# Patient Record
Sex: MALE | Race: WHITE | NOT HISPANIC OR LATINO | Employment: OTHER | URBAN - METROPOLITAN AREA
[De-identification: names, ages, dates, MRNs, and addresses within clinical notes are randomized per-mention and may not be internally consistent; named-entity substitution may affect disease eponyms.]

---

## 2021-08-17 ENCOUNTER — OFFICE VISIT (OUTPATIENT)
Dept: GASTROENTEROLOGY | Facility: CLINIC | Age: 66
End: 2021-08-17

## 2021-08-17 VITALS
BODY MASS INDEX: 22.96 KG/M2 | TEMPERATURE: 97.8 F | SYSTOLIC BLOOD PRESSURE: 112 MMHG | HEIGHT: 72 IN | DIASTOLIC BLOOD PRESSURE: 82 MMHG | WEIGHT: 169.5 LBS | OXYGEN SATURATION: 96 % | HEART RATE: 79 BPM

## 2021-08-17 DIAGNOSIS — K59.04 CHRONIC IDIOPATHIC CONSTIPATION: Primary | ICD-10-CM

## 2021-08-17 DIAGNOSIS — K63.5 POLYP OF COLON, UNSPECIFIED PART OF COLON, UNSPECIFIED TYPE: ICD-10-CM

## 2021-08-17 DIAGNOSIS — Z80.0 FAMILY HISTORY OF COLON CANCER: ICD-10-CM

## 2021-08-17 DIAGNOSIS — Z12.11 ENCOUNTER FOR SCREENING FOR MALIGNANT NEOPLASM OF COLON: ICD-10-CM

## 2021-08-17 PROCEDURE — 99203 OFFICE O/P NEW LOW 30 MIN: CPT | Performed by: NURSE PRACTITIONER

## 2021-08-17 RX ORDER — PSEUDOEPHED/ACETAMINOPH/DIPHEN 30MG-500MG
TABLET ORAL
COMMUNITY
Start: 2021-08-11 | End: 2021-08-17 | Stop reason: ALTCHOICE

## 2021-08-17 RX ORDER — DOXAZOSIN MESYLATE 4 MG/1
4 TABLET ORAL
COMMUNITY
Start: 2021-07-19

## 2021-08-17 RX ORDER — KETOCONAZOLE 20 MG/ML
SHAMPOO TOPICAL
COMMUNITY
Start: 2021-06-29

## 2021-08-17 RX ORDER — SUCRALFATE 1 G/1
1 TABLET ORAL 3 TIMES DAILY
COMMUNITY
Start: 2021-07-19

## 2021-08-17 RX ORDER — BUSPIRONE HYDROCHLORIDE 10 MG/1
10 TABLET ORAL 2 TIMES DAILY
COMMUNITY
Start: 2021-07-19

## 2021-08-17 RX ORDER — CHLORHEXIDINE GLUCONATE 0.12 MG/ML
RINSE ORAL
COMMUNITY
Start: 2021-05-10

## 2021-08-17 RX ORDER — MECLIZINE HYDROCHLORIDE 25 MG/1
25 TABLET ORAL DAILY PRN
COMMUNITY
Start: 2021-07-26 | End: 2021-08-17 | Stop reason: ALTCHOICE

## 2021-08-17 RX ORDER — DOCUSATE SODIUM -SENNOSIDES 50; 8.6 MG/1; MG/1
1 TABLET, COATED ORAL 2 TIMES DAILY
COMMUNITY
Start: 2021-07-19

## 2021-08-17 RX ORDER — CALCIUM CARBONATE 500 MG/1
TABLET, CHEWABLE ORAL
COMMUNITY
Start: 2021-07-30

## 2021-08-17 RX ORDER — CLOZAPINE 100 MG/1
TABLET ORAL
COMMUNITY
Start: 2021-07-28 | End: 2021-08-17 | Stop reason: ALTCHOICE

## 2021-08-17 RX ORDER — ATORVASTATIN CALCIUM 40 MG/1
40 TABLET, FILM COATED ORAL
COMMUNITY
Start: 2021-07-19

## 2021-08-17 RX ORDER — GABAPENTIN 300 MG/1
300 CAPSULE ORAL 3 TIMES DAILY
Status: ON HOLD | COMMUNITY
Start: 2021-08-14 | End: 2022-01-06

## 2021-08-17 RX ORDER — DILTIAZEM HYDROCHLORIDE 60 MG/1
2 TABLET, FILM COATED ORAL 2 TIMES DAILY
COMMUNITY
Start: 2021-07-19

## 2021-08-17 RX ORDER — MULTIVITAMIN
1 TABLET ORAL DAILY
COMMUNITY
Start: 2021-07-19

## 2021-08-17 RX ORDER — BENZTROPINE MESYLATE 1 MG/1
2 TABLET ORAL
COMMUNITY
Start: 2021-07-19

## 2021-08-17 RX ORDER — MULTIVIT-MIN/IRON/FOLIC ACID/K 18-600-40
1000 CAPSULE ORAL DAILY
COMMUNITY
Start: 2021-07-19 | End: 2021-08-17 | Stop reason: ALTCHOICE

## 2021-08-17 RX ORDER — OMEPRAZOLE 40 MG/1
40 CAPSULE, DELAYED RELEASE ORAL DAILY
COMMUNITY
Start: 2021-07-19

## 2021-08-17 RX ORDER — PNV NO.95/FERROUS FUM/FOLIC AC 28MG-0.8MG
1 TABLET ORAL 2 TIMES DAILY
COMMUNITY
Start: 2021-07-19

## 2021-08-17 RX ORDER — HYDRALAZINE HYDROCHLORIDE 50 MG/1
50 TABLET, FILM COATED ORAL 2 TIMES DAILY
COMMUNITY
Start: 2021-07-19

## 2021-08-17 RX ORDER — FLUTICASONE PROPIONATE 50 MCG
SPRAY, SUSPENSION (ML) NASAL
COMMUNITY
Start: 2021-07-26

## 2021-08-17 RX ORDER — POLYETHYLENE GLYCOL 3350 17 G/17G
17 POWDER, FOR SOLUTION ORAL 2 TIMES DAILY
Qty: 60 PACKET | Refills: 5 | Status: SHIPPED | OUTPATIENT
Start: 2021-08-17 | End: 2022-01-19

## 2021-08-17 RX ORDER — CETIRIZINE HYDROCHLORIDE 10 MG/1
10 TABLET ORAL DAILY
COMMUNITY
Start: 2021-07-19

## 2021-08-17 RX ORDER — MONTELUKAST SODIUM 10 MG/1
10 TABLET ORAL DAILY
COMMUNITY
Start: 2021-07-19

## 2021-08-17 RX ORDER — FINASTERIDE 5 MG/1
5 TABLET, FILM COATED ORAL DAILY
COMMUNITY
Start: 2021-07-19

## 2021-08-17 RX ORDER — ACETAMINOPHEN AND CODEINE PHOSPHATE 300; 30 MG/1; MG/1
0.5 TABLET ORAL 2 TIMES DAILY
COMMUNITY
Start: 2021-07-15

## 2021-08-17 NOTE — PATIENT INSTRUCTIONS
Start Miralax 17 g twice daily for constipation.   Prescription sent electronically to [facility].    Stop miralax as needed dose    Continue Senexon - 50-8.6 mg one pill twice daily.     Will request previous colonoscopy records and our office will contact you regarding timing of next colonoscopy.   Will also need additional bowel prep for long standing constipation with next colonoscopy as discussed

## 2021-08-17 NOTE — PROGRESS NOTES
"Chief Complaint   Patient presents with   • Constipation         History of Present Illness  66-year-old male presents today for constipation.  He was a patient of our previous office with several colonoscopies.  He has a history of colon polyps and and family history of colon cancer in his maternal grandmother.     He has chronic constipation.  He is currently taking Senokot twice daily.  He has an order for MiraLAX but does not take this regularly.  He can go up to 2 weeks without a spontaneous bowel movement.  He will experience darker stools but no bright red blood per rectum.  This has been a longstanding pattern.      We do not have his previous endoscopic evaluation for review at today's visit.  He states he has been having colonoscopies every 3 years given family history and personal history of colon polyps.    He has had previous colonoscopies with poor prep despite following the prep instructions.  He is wondering if he will need a 2-day prep or a stronger prep for his next colonoscopy.    Vital Signs:   /82   Pulse 79   Temp 97.8 °F (36.6 °C)   Ht 182.9 cm (72\")   Wt 76.9 kg (169 lb 8 oz)   SpO2 96%   BMI 22.99 kg/m²     Body mass index is 22.99 kg/m².     Physical Exam  Vitals reviewed.   Constitutional:       General: He is not in acute distress.     Appearance: Normal appearance. He is not ill-appearing.   Abdominal:      General: Bowel sounds are normal. There is no distension.      Palpations: Abdomen is soft. Abdomen is not rigid. There is no mass or pulsatile mass.      Tenderness: There is no abdominal tenderness. There is no guarding or rebound.   Neurological:      Mental Status: He is alert.       Assessment and Plan    Diagnoses and all orders for this visit:    1. Chronic idiopathic constipation (Primary)  -     polyethylene glycol (MIRALAX) 17 g packet; Take 17 g by mouth 2 (Two) Times a Day.  Dispense: 60 packet; Refill: 5    2. Polyp of colon, unspecified part of colon, " unspecified type    3. Encounter for screening for malignant neoplasm of colon    4. Family history of colon cancer       Chronic idiopathic constipation, longstanding.  Will start MiraLAX 17 g twice daily, prescription provided.  Also written copy of today's instructions provided for his residence.  Will discontinue as needed MiraLAX order as he does not take this regularly and does not remember to ask for it regularly.    We will request previous records, he thinks his last colonoscopy was approximately 2 years ago.  We will make additional recommendations regarding timing of endoscopic evaluation based on review of previous colonoscopy and pathology results.  Will also need to make recommendations regarding bowel prep, will most likely need prescription strength bowel prep or magnesium citrate with polyethylene glycol.    Patient verbalized agreement and understanding with the above plan, all questions answered and support provided.      Patient Instructions   Start Miralax 17 g twice daily for constipation.   Prescription sent electronically to [facility].    Stop miralax as needed dose    Continue Senexon - 50-8.6 mg one pill twice daily.     Will request previous colonoscopy records and our office will contact you regarding timing of next colonoscopy.   Will also need additional bowel prep for long standing constipation with next colonoscopy as discussed          EMR Dragon/Transcription Disclaimer:  This document has been Dictated utilizing Dragon dictation.

## 2021-09-22 ENCOUNTER — OFFICE VISIT (OUTPATIENT)
Dept: GASTROENTEROLOGY | Facility: CLINIC | Age: 66
End: 2021-09-22

## 2021-09-22 VITALS
DIASTOLIC BLOOD PRESSURE: 80 MMHG | BODY MASS INDEX: 22.71 KG/M2 | OXYGEN SATURATION: 98 % | TEMPERATURE: 97.2 F | WEIGHT: 167.7 LBS | HEIGHT: 72 IN | HEART RATE: 81 BPM | SYSTOLIC BLOOD PRESSURE: 120 MMHG

## 2021-09-22 DIAGNOSIS — K63.5 POLYP OF COLON, UNSPECIFIED PART OF COLON, UNSPECIFIED TYPE: ICD-10-CM

## 2021-09-22 DIAGNOSIS — Z12.11 ENCOUNTER FOR SCREENING FOR MALIGNANT NEOPLASM OF COLON: ICD-10-CM

## 2021-09-22 DIAGNOSIS — K59.04 CHRONIC IDIOPATHIC CONSTIPATION: Primary | ICD-10-CM

## 2021-09-22 DIAGNOSIS — Z80.0 FAMILY HISTORY OF COLON CANCER: ICD-10-CM

## 2021-09-22 PROCEDURE — 99213 OFFICE O/P EST LOW 20 MIN: CPT | Performed by: NURSE PRACTITIONER

## 2021-09-22 NOTE — PROGRESS NOTES
"Chief Complaint   Patient presents with   • Follow-up     Obstipation         History of Present Illness  66-year-old male presents today for follow-up.  Last office visit August 17, 2021.  Family history of colon polyps and family history of colon cancer in his maternal grandmother.  He is followed by this office for chronic constipation.    He would like to schedule colonoscopy.    At his last visit MiraLAX was added twice daily in addition to his twice daily Senokot.  He is having more complete bowel movements approximately 3 times per week with this regimen.  He is no longer straining.    Heartburn and reflux is stable.  Will take Tums at times for nausea.  Continues on sucralfate.    Patient has had previous colonoscopies with poor prep despite following prep instructions.  He has been having colonoscopies every 3 years given family history of colon cancer and personal history of colon polyps.  He would like to schedule.    Otherwise patient is doing well from a GI standpoint and is happy with the improvement in constipation.    He is a resident of [facility] in [place].  Phone #[phone].    Vital Signs:   /80   Pulse 81   Temp 97.2 °F (36.2 °C)   Ht 182.9 cm (72\")   Wt 76.1 kg (167 lb 11.2 oz)   SpO2 98%   BMI 22.74 kg/m²     Body mass index is 22.74 kg/m².     Physical Exam  Vitals reviewed.   Constitutional:       General: He is not in acute distress.     Appearance: Normal appearance. He is well-developed and normal weight. He is not ill-appearing.   Pulmonary:      Effort: Pulmonary effort is normal. No respiratory distress.   Skin:     Coloration: Skin is not pale.   Neurological:      Mental Status: He is alert.       Assessment and Plan    Diagnoses and all orders for this visit:    1. Chronic idiopathic constipation (Primary)    2. Polyp of colon, unspecified part of colon, unspecified type    3. Encounter for screening for malignant neoplasm of colon    4. Family history of " colon cancer    Chronic constipation, improved.  Continue MiraLAX 17 g twice daily.  Continue Senokot 1 pill twice daily.  History of colon polyps and significant family history of colon cancer.  Have requested previous colonoscopy records and our office will contact you regarding timing of next colonoscopy.    Patient is a resident of [facility] in [place].    With his previous colonoscopy he was transported via [employer] and once he arrived back to [facility], an staff member was waiting for him, escorting him to his room and monitoring him after conscious sedation.    Patient does not have anyone that can accompany him to his endoscopic procedures and is wondering if we would be able to do the same thing using the medical taxi, telephone follow-up with [facility] and then waiting for his return [facility] after his procedure.    We will discuss with Dr. Valdivia and contact patient with this information.    ADDENDUM: 9/24/2021 1040 am:  Spoke with Juan with [facility] at [phone].    Patient does not have a guardian.    He makes his own medical decisions.    She confirmed that [facility] and be available to discuss results and recommendations after his procedure.    Will await previous colonoscopy operative report and pathology and discuss with Dr. Valdivia and endoscopy department and will contact patient next week with information. Linden    ADDENDUM:   Spoke with [facility] at [phone], they will be available after his endoscopy procedure to meet patient at [facility] when he is delivered back to his residence, [facility] via [employer].  Once endoscopy is completed, they will contact medical taxi service and they will return to pick patient up.    Dr. Valdivia has done this for patient in the past and is comfortable and agrees to this procedure as patient has no other means  of transportation and [facility], his residence has agreed to meet patient at [facility] as they have done in the past with his previous procedures.Linden    EMR Dragon/Transcription Disclaimer:  This document has been Dictated utilizing Dragon dictation.

## 2021-09-27 ENCOUNTER — PREP FOR SURGERY (OUTPATIENT)
Dept: SURGERY | Facility: SURGERY CENTER | Age: 66
End: 2021-09-27

## 2021-09-27 DIAGNOSIS — K63.5 POLYP OF COLON, UNSPECIFIED PART OF COLON, UNSPECIFIED TYPE: ICD-10-CM

## 2021-09-27 DIAGNOSIS — Z80.0 FAMILY HISTORY OF COLON CANCER: Primary | ICD-10-CM

## 2021-09-27 DIAGNOSIS — Z12.11 ENCOUNTER FOR SCREENING FOR MALIGNANT NEOPLASM OF COLON: ICD-10-CM

## 2021-09-27 RX ORDER — SODIUM CHLORIDE, SODIUM LACTATE, POTASSIUM CHLORIDE, CALCIUM CHLORIDE 600; 310; 30; 20 MG/100ML; MG/100ML; MG/100ML; MG/100ML
30 INJECTION, SOLUTION INTRAVENOUS CONTINUOUS PRN
Status: CANCELLED | OUTPATIENT
Start: 2021-09-27

## 2021-09-27 RX ORDER — SODIUM CHLORIDE 0.9 % (FLUSH) 0.9 %
3 SYRINGE (ML) INJECTION EVERY 12 HOURS SCHEDULED
Status: CANCELLED | OUTPATIENT
Start: 2021-09-27

## 2021-09-27 RX ORDER — SODIUM CHLORIDE 0.9 % (FLUSH) 0.9 %
10 SYRINGE (ML) INJECTION AS NEEDED
Status: CANCELLED | OUTPATIENT
Start: 2021-09-27

## 2021-10-12 ENCOUNTER — TELEPHONE (OUTPATIENT)
Dept: GASTROENTEROLOGY | Facility: CLINIC | Age: 66
End: 2021-10-12

## 2021-11-03 ENCOUNTER — TELEPHONE (OUTPATIENT)
Dept: GASTROENTEROLOGY | Facility: CLINIC | Age: 66
End: 2021-11-03

## 2021-11-03 PROBLEM — Z12.11 ENCOUNTER FOR SCREENING FOR MALIGNANT NEOPLASM OF COLON: Status: ACTIVE | Noted: 2021-11-03

## 2021-11-03 PROBLEM — K63.5 POLYP OF COLON: Status: ACTIVE | Noted: 2021-11-03

## 2021-12-15 ENCOUNTER — TELEPHONE (OUTPATIENT)
Dept: GASTROENTEROLOGY | Facility: CLINIC | Age: 66
End: 2021-12-15

## 2021-12-21 ENCOUNTER — TELEPHONE (OUTPATIENT)
Dept: GASTROENTEROLOGY | Facility: CLINIC | Age: 66
End: 2021-12-21

## 2021-12-21 NOTE — TELEPHONE ENCOUNTER
Patient is having a colonoscopy on 1/6 and he is taking tylenol with codeine and he wants to make sure that its ok to take.  [phone]

## 2021-12-22 NOTE — TELEPHONE ENCOUNTER
Returned call to deepika canela and had to leave him a message that it is ok to take the medication up until his procedure but not the morning of the procedure

## 2022-01-06 ENCOUNTER — HOSPITAL ENCOUNTER (OUTPATIENT)
Facility: SURGERY CENTER | Age: 67
Setting detail: HOSPITAL OUTPATIENT SURGERY
Discharge: HOME | End: 2022-01-06
Attending: INTERNAL MEDICINE | Admitting: INTERNAL MEDICINE

## 2022-01-06 ENCOUNTER — ANESTHESIA EVENT (OUTPATIENT)
Dept: SURGERY | Facility: SURGERY CENTER | Age: 67
End: 2022-01-06

## 2022-01-06 ENCOUNTER — ANESTHESIA (OUTPATIENT)
Dept: SURGERY | Facility: SURGERY CENTER | Age: 67
End: 2022-01-06

## 2022-01-06 VITALS
RESPIRATION RATE: 16 BRPM | HEART RATE: 92 BPM | WEIGHT: 165.5 LBS | DIASTOLIC BLOOD PRESSURE: 85 MMHG | OXYGEN SATURATION: 100 % | HEIGHT: 72 IN | TEMPERATURE: 98.1 F | SYSTOLIC BLOOD PRESSURE: 125 MMHG | BODY MASS INDEX: 22.42 KG/M2

## 2022-01-06 DIAGNOSIS — Z80.0 FAMILY HISTORY OF COLON CANCER: ICD-10-CM

## 2022-01-06 DIAGNOSIS — K63.5 POLYP OF COLON, UNSPECIFIED PART OF COLON, UNSPECIFIED TYPE: ICD-10-CM

## 2022-01-06 DIAGNOSIS — Z12.11 ENCOUNTER FOR SCREENING FOR MALIGNANT NEOPLASM OF COLON: ICD-10-CM

## 2022-01-06 PROCEDURE — 45385 COLONOSCOPY W/LESION REMOVAL: CPT | Performed by: INTERNAL MEDICINE

## 2022-01-06 PROCEDURE — 45380 COLONOSCOPY AND BIOPSY: CPT | Performed by: INTERNAL MEDICINE

## 2022-01-06 PROCEDURE — 88305 TISSUE EXAM BY PATHOLOGIST: CPT | Performed by: INTERNAL MEDICINE

## 2022-01-06 PROCEDURE — 0DBK8ZZ EXCISION OF ASCENDING COLON, VIA NATURAL OR ARTIFICIAL OPENING ENDOSCOPIC: ICD-10-PCS | Performed by: NURSE PRACTITIONER

## 2022-01-06 PROCEDURE — 25010000002 PROPOFOL 10 MG/ML EMULSION: Performed by: ANESTHESIOLOGY

## 2022-01-06 PROCEDURE — 45381 COLONOSCOPY SUBMUCOUS NJX: CPT | Performed by: INTERNAL MEDICINE

## 2022-01-06 RX ORDER — LIDOCAINE HYDROCHLORIDE 20 MG/ML
INJECTION, SOLUTION INFILTRATION; PERINEURAL AS NEEDED
Status: DISCONTINUED | OUTPATIENT
Start: 2022-01-06 | End: 2022-01-06 | Stop reason: SURG

## 2022-01-06 RX ORDER — SODIUM CHLORIDE, SODIUM LACTATE, POTASSIUM CHLORIDE, CALCIUM CHLORIDE 600; 310; 30; 20 MG/100ML; MG/100ML; MG/100ML; MG/100ML
30 INJECTION, SOLUTION INTRAVENOUS CONTINUOUS PRN
Status: DISCONTINUED | OUTPATIENT
Start: 2022-01-06 | End: 2022-01-06 | Stop reason: HOSPADM

## 2022-01-06 RX ORDER — SODIUM CHLORIDE 0.9 % (FLUSH) 0.9 %
10 SYRINGE (ML) INJECTION AS NEEDED
Status: DISCONTINUED | OUTPATIENT
Start: 2022-01-06 | End: 2022-01-06 | Stop reason: HOSPADM

## 2022-01-06 RX ORDER — SODIUM CHLORIDE 0.9 % (FLUSH) 0.9 %
3 SYRINGE (ML) INJECTION EVERY 12 HOURS SCHEDULED
Status: DISCONTINUED | OUTPATIENT
Start: 2022-01-06 | End: 2022-01-06 | Stop reason: HOSPADM

## 2022-01-06 RX ORDER — PROPOFOL 10 MG/ML
VIAL (ML) INTRAVENOUS AS NEEDED
Status: DISCONTINUED | OUTPATIENT
Start: 2022-01-06 | End: 2022-01-06 | Stop reason: SURG

## 2022-01-06 RX ADMIN — PROPOFOL 180 MCG/KG/MIN: 10 INJECTION, EMULSION INTRAVENOUS at 09:53

## 2022-01-06 RX ADMIN — LIDOCAINE HYDROCHLORIDE 60 MG: 20 INJECTION, SOLUTION INFILTRATION; PERINEURAL at 09:53

## 2022-01-06 RX ADMIN — PROPOFOL 80 MG: 10 INJECTION, EMULSION INTRAVENOUS at 09:53

## 2022-01-06 RX ADMIN — SODIUM CHLORIDE, POTASSIUM CHLORIDE, SODIUM LACTATE AND CALCIUM CHLORIDE 30 ML/HR: 600; 310; 30; 20 INJECTION, SOLUTION INTRAVENOUS at 09:07

## 2022-01-06 NOTE — ANESTHESIA POSTPROCEDURE EVALUATION
"Patient: Trevor Fox    Procedure Summary     Date: 01/06/22 Room / Location: [facility] / [facility]    Anesthesia Start: 0948 Anesthesia Stop: 1025    Procedure: COLONOSCOPY FOR SCREENING (N/A ) Diagnosis:       Family history of colon cancer      Polyp of colon, unspecified part of colon, unspecified type      Encounter for screening for malignant neoplasm of colon      (Family history of colon cancer [Z80.0])      (Polyp of colon, unspecified part of colon, unspecified type [K63.5])      (Encounter for screening for malignant neoplasm of colon [Z12.11])    Surgeons: Ramon Valdivia MD Provider: Alirio Melissa MD    Anesthesia Type: MAC ASA Status: 3          Anesthesia Type: MAC    Vitals  Vitals Value Taken Time   /85 01/06/22 1040   Temp 36.7 °C (98.1 °F) 01/06/22 1025   Pulse 92 01/06/22 1040   Resp 16 01/06/22 1040   SpO2 100 % 01/06/22 1040           Post Anesthesia Care and Evaluation    Patient location during evaluation: bedside  Patient participation: complete - patient participated  Level of consciousness: awake and alert  Pain management: adequate  Airway patency: patent  Anesthetic complications: No anesthetic complications    Cardiovascular status: acceptable  Respiratory status: acceptable  Hydration status: acceptable    Comments: /85   Pulse 92   Temp 36.7 °C (98.1 °F) (Infrared)   Resp 16   Ht 182.9 cm (72\")   Wt 75.1 kg (165 lb 8 oz)   SpO2 100%   BMI 22.45 kg/m²           "

## 2022-01-06 NOTE — H&P
No chief complaint on file.      HPI  H/o polyps         Problem List:    Patient Active Problem List   Diagnosis   • Family history of colon cancer   • Polyp of colon   • Encounter for screening for malignant neoplasm of colon       Medical History:    Past Medical History:   Diagnosis Date   • Anemia    • Chronic schizophrenia (HCC)    • Enlarged prostate    • GERD (gastroesophageal reflux disease)    • Hyperlipidemia    • Hypertension    • Kidney disease     Stage 2   • Seasonal allergies         Social History:    Social History     Socioeconomic History   • Marital status:    Tobacco Use   • Smoking status: Former Smoker   • Smokeless tobacco: Former User     Types: Chew   • Tobacco comment: 30 years ago   Vaping Use   • Vaping Use: Never used   Substance and Sexual Activity   • Alcohol use: Not Currently   • Drug use: Never   • Sexual activity: Defer       Family History:   Family History   Problem Relation Age of Onset   • Colon cancer Maternal Grandmother    • Colon polyps Neg Hx    • Crohn's disease Neg Hx    • Irritable bowel syndrome Neg Hx    • Ulcerative colitis Neg Hx        Surgical History:   Past Surgical History:   Procedure Laterality Date   • COLONOSCOPY     • CYST REMOVAL     • ENDOSCOPY         No current facility-administered medications for this encounter.    Current Outpatient Medications:   •  acetaminophen-codeine (TYLENOL #3) 300-30 MG per tablet, Take 0.5 tablets by mouth 2 (Two) Times a Day., Disp: , Rfl:   •  Antacid Regular Strength 500 MG chewable tablet, TAKE TWO TABLETS BY MOUTH THREE TIMES DAILY AS NEEDED, Disp: , Rfl:   •  aspirin 325 MG EC tablet, Take 325 mg by mouth Daily., Disp: , Rfl:   •  atorvastatin (LIPITOR) 40 MG tablet, Take 40 mg by mouth every night at bedtime., Disp: , Rfl:   •  benztropine (COGENTIN) 1 MG tablet, Take 2 mg by mouth every night at bedtime., Disp: , Rfl:   •  busPIRone (BUSPAR) 10 MG tablet, Take 10 mg by mouth 2 (Two) Times a Day., Disp: ,  Rfl:   •  cetirizine (zyrTEC) 10 MG tablet, Take 10 mg by mouth Daily., Disp: , Rfl:   •  chlorhexidine (PERIDEX) 0.12 % solution, RINSE TWO TO THREE TIMES PER DAY AS DIRECTED BY YOUR DENTIST, Disp: , Rfl:   •  doxazosin (CARDURA) 4 MG tablet, Take 4 mg by mouth every night at bedtime., Disp: , Rfl:   •  ferrous sulfate 325 (65 Fe) MG tablet, Take 1 tablet by mouth 2 (Two) Times a Day., Disp: , Rfl:   •  finasteride (PROSCAR) 5 MG tablet, Take 5 mg by mouth Daily., Disp: , Rfl:   •  fluticasone (FLONASE) 50 MCG/ACT nasal spray, INSTILL TWO SPRAYS in EACH nostril ONCE daily, Disp: , Rfl:   •  gabapentin (NEURONTIN) 300 MG capsule, Take 300 mg by mouth 3 (Three) Times a Day., Disp: , Rfl:   •  hydrALAZINE (APRESOLINE) 50 MG tablet, Take 50 mg by mouth 2 (Two) Times a Day., Disp: , Rfl:   •  Incruse Ellipta 62.5 MCG/INH aerosol powder , INHALE ONE PUFF BY MOUTH AT BEDTIME, Disp: , Rfl:   •  ketoconazole (NIZORAL) 2 % shampoo, , Disp: , Rfl:   •  metoprolol tartrate (LOPRESSOR) 25 MG tablet, Take 25 mg by mouth Daily., Disp: , Rfl:   •  montelukast (SINGULAIR) 10 MG tablet, Take 10 mg by mouth Daily., Disp: , Rfl:   •  multivitamin tablet, Take 1 tablet by mouth Daily., Disp: , Rfl:   •  omeprazole (priLOSEC) 40 MG capsule, Take 40 mg by mouth Daily., Disp: , Rfl:   •  polyethylene glycol (MIRALAX) 17 g packet, Take 17 g by mouth 2 (Two) Times a Day., Disp: 60 packet, Rfl: 5  •  Senexon-S 8.6-50 MG per tablet, Take 1 tablet by mouth 2 (Two) Times a Day., Disp: , Rfl:   •  sucralfate (CARAFATE) 1 g tablet, Take 1 g by mouth 3 (Three) Times a Day., Disp: , Rfl:   •  Symbicort 80-4.5 MCG/ACT inhaler, Inhale 2 puffs 2 (Two) Times a Day., Disp: , Rfl:     Allergies: No Known Allergies     The following portions of the patient's history were reviewed by me and updated as appropriate: review of systems, allergies, current medications, past family history, past medical history, past social history, past surgical history and  problem list.    There were no vitals filed for this visit.    PHYSICAL EXAM:    CONSTITUTIONAL:  today's vital signs reviewed by me  GASTROINTESTINAL: abdomen is soft nontender nondistended with normal active bowel sounds, no masses are appreciated    Assessment/ Plan  H/o polyps    colonoscopy    Risks and benefits as well as alternatives to endoscopic evaluation were explained to the patient and they voiced understanding and wish to proceed.  These risks include but are not limited to the risk of bleeding, perforation, adverse reaction to sedation, and missed lesions.  The patient was given the opportunity to ask questions prior to the endoscopic procedure.

## 2022-01-06 NOTE — ANESTHESIA PREPROCEDURE EVALUATION
Anesthesia Evaluation     Patient summary reviewed and Nursing notes reviewed   no history of anesthetic complications:  NPO Solid Status: > 6 hours  NPO Liquid Status: > 6 hours           Airway   Mallampati: II  TM distance: >3 FB  Neck ROM: full  no difficulty expected and No difficulty expected  Dental - normal exam     Pulmonary - negative pulmonary ROS and normal exam    breath sounds clear to auscultation  (-) rhonchi, decreased breath sounds, wheezes, rales, stridor  Cardiovascular - normal exam    NYHA Classification: I  Rhythm: regular  Rate: normal    (+) hypertension, hyperlipidemia,   (-) murmur, weak pulses, friction rub, systolic click, carotid bruits, JVD, peripheral edema      Neuro/Psych  (+) psychiatric history Schizophrenia,     GI/Hepatic/Renal/Endo    (+)  GERD,  renal disease CRI,     Musculoskeletal (-) negative ROS    Abdominal  - normal exam    Abdomen: soft.   Substance History - negative use     OB/GYN negative ob/gyn ROS         Other   blood dyscrasia anemia,                     Anesthesia Plan    ASA 3     MAC     intravenous induction     Anesthetic plan, all risks, benefits, and alternatives have been provided, discussed and informed consent has been obtained with: patient.

## 2022-01-06 NOTE — PRE-PROCEDURE NOTE
Pt unable to remember medications and when taken last.  Called Tatianna at [facility] to fax medications.  Will attach to chart for review.

## 2022-01-07 LAB
LAB AP CASE REPORT: NORMAL
LAB AP CLINICAL INFORMATION: NORMAL
PATH REPORT.FINAL DX SPEC: NORMAL
PATH REPORT.GROSS SPEC: NORMAL

## 2022-01-19 DIAGNOSIS — K59.04 CHRONIC IDIOPATHIC CONSTIPATION: ICD-10-CM

## 2022-01-19 RX ORDER — POLYETHYLENE GLYCOL 3350 17 G/17G
POWDER, FOR SOLUTION ORAL
Qty: 510 G | Refills: 5 | Status: SHIPPED | OUTPATIENT
Start: 2022-01-19 | End: 2022-06-27

## 2022-04-12 ENCOUNTER — OFFICE VISIT (OUTPATIENT)
Dept: GASTROENTEROLOGY | Facility: CLINIC | Age: 67
End: 2022-04-12

## 2022-04-12 VITALS
WEIGHT: 169.1 LBS | OXYGEN SATURATION: 98 % | TEMPERATURE: 96.9 F | HEIGHT: 72 IN | HEART RATE: 90 BPM | DIASTOLIC BLOOD PRESSURE: 70 MMHG | BODY MASS INDEX: 22.9 KG/M2 | SYSTOLIC BLOOD PRESSURE: 128 MMHG

## 2022-04-12 DIAGNOSIS — K59.04 CHRONIC IDIOPATHIC CONSTIPATION: Primary | ICD-10-CM

## 2022-04-12 DIAGNOSIS — Z80.0 FAMILY HISTORY OF COLON CANCER: ICD-10-CM

## 2022-04-12 DIAGNOSIS — D12.2 ADENOMATOUS POLYP OF ASCENDING COLON: ICD-10-CM

## 2022-04-12 PROCEDURE — 99213 OFFICE O/P EST LOW 20 MIN: CPT | Performed by: NURSE PRACTITIONER

## 2022-04-12 RX ORDER — ALBUTEROL SULFATE 90 UG/1
AEROSOL, METERED RESPIRATORY (INHALATION)
COMMUNITY
Start: 2022-01-03

## 2022-04-12 RX ORDER — GABAPENTIN 300 MG/1
CAPSULE ORAL
COMMUNITY
Start: 2022-01-17

## 2022-04-12 RX ORDER — MECLIZINE HYDROCHLORIDE 25 MG/1
25 TABLET ORAL DAILY PRN
COMMUNITY
Start: 2022-01-03

## 2022-04-12 RX ORDER — CLOZAPINE 100 MG/1
TABLET ORAL
COMMUNITY
Start: 2022-01-13

## 2022-04-12 RX ORDER — PSEUDOEPHED/ACETAMINOPH/DIPHEN 30MG-500MG
TABLET ORAL
COMMUNITY
Start: 2022-01-17

## 2022-04-12 NOTE — PATIENT INSTRUCTIONS
For history of colon polyps, recommend colonoscopy in 1 year, February 2023.  We will contact you November 2022 to schedule colonoscopy with Dr. Valdivia February 2023.    To help with bowel prep, recommend the addition of magnesium citrate prior to MiraLAX bowel prep.  This can be purchased over-the-counter.  We will give directions regarding bowel prep when we contact you November 2022.    For constipation continue MiraLAX 1 capful twice daily.    For constipation, please check and see if you are taking Senokot 8.6/50, another name is Senexon-S 8.6-50 MG per tablet 1 pill twice daily.  If you are taking this medication 1 pill twice daily, increase to 2 pills twice daily to help with constipation.  This can be taken daily with MiraLAX.    If you are not taking this medication, please restart this medication as it has worked well in the past to help regulate your bowel movements.

## 2022-04-12 NOTE — PROGRESS NOTES
"Chief Complaint   Patient presents with   • Follow-up     Chronic constipation, review recent colonoscopy with large colon polyps           History of Present Illness  66-year-old male presents today for follow-up.  Last visit September 22, 2021.  He underwent colonoscopy anywhere 6/2022.  There is a family history of colon polyps and family history of colon cancer in his maternal grandmother.  He also has a personal history of colon polyps.  He is followed by this office for chronic constipation.    He has noticed constipation has worsened over the past several weeks.  He is currently taking MiraLAX 1 capful twice daily.  He has incomplete evacuation and can go up to 4 5 days without a bowel movement.  Review of his chart shows that he was on Senokot 1 pill twice daily at his last office visit and this was helping bowel movements occur every 2 to 3 days.  He is not sure if he is still taking this medication.    He is a resident of [facility] in [place].  Phone number [phone].    Result Review :       Tissue Pathology Exam (01/06/2022 10:04)   COLONOSCOPY (01/06/2022 09:41)     Vital Signs:   /70   Pulse 90   Temp 96.9 °F (36.1 °C)   Ht 182.9 cm (72\")   Wt 76.7 kg (169 lb 1.6 oz)   SpO2 98%   BMI 22.93 kg/m²     Body mass index is 22.93 kg/m².     Physical Exam  Vitals reviewed.   Constitutional:       General: He is not in acute distress.     Appearance: Normal appearance. He is well-developed and normal weight. He is not ill-appearing, toxic-appearing or diaphoretic.   Pulmonary:      Effort: Pulmonary effort is normal. No respiratory distress.   Abdominal:      General: Abdomen is flat.   Skin:     Coloration: Skin is not pale.   Neurological:      Mental Status: He is alert.           Assessment and Plan    Diagnoses and all orders for this visit:    1. Chronic idiopathic constipation (Primary)    2. Family history of colon cancer    3. Adenomatous polyp of ascending colon     " Colonoscopy January 6, 2022.  Bowel prep was good to poor.  Patient with 3 sessile polyps in the cecum, 2 to 3 mm in size consistent with tubular adenoma.  3 sessile polyps in the hepatic flexure, 4 to 15 mm in size.  Area was tattooed.  Multiple small largemouth diverticula in the sigmoid and descending colon.  Nonbleeding internal hemorrhoids, moderate.  Melanosis coli throughout the entire colon, biopsies negative for microscopic colitis.    Reviewed colonoscopy and pathology results in detail during today's visit.  Patient has difficulty with bowel prep.  He has a history of multiple polyps and regrowing polyp in the ascending colon.  This area was tattooed and Dr. Valdivia recommends survey colonoscopies on a short interval to continue to remove any regrowth of polyp.  This has been scheduled for 1 year.  Patient would like to continue surveillance colonoscopies more frequently and would like to avoid surgical resection of the area if possible.    Given his difficulty with chronic constipation, we will add magnesium citrate to his bowel prep for his colonoscopy in 2023 to help ensure bowel prep is more successful.    We will contact patient November 2022 to schedule colonoscopy for February 2023.    Patient was instructed to continue MiraLAX, he will check at home to see if he is still on Senokot, if he is not, instructed to restart it as this has been helpful to promote bowel movements every 2 to 3 days otherwise if he is on Senokot 1 pill twice daily, patient was instructed to increase Senokot to 2 pills twice daily with MiraLAX to help promote more regular bowel movements.    Patient verbalized agreement and understanding with the above plan, all questions answered and support provided.    Patient is a resident at [facility].  He does not have a guardian, he makes his own medical decisions.  He arrived via [employer] for his colonoscopy January 6, 2022 and was delivered back to his residence at [facility]  house via medical taxi.  We will arrange for the same process at his colonoscopy in 2023 if he continues to reside at [facility].    We will need to let endoscopy know and make sure that we are in touch with [facility] at [phone] to ensure this is still the process that we can expect after his endoscopic procedure.        Patient Instructions   For history of colon polyps, recommend colonoscopy in 1 year, February 2023.  We will contact you November 2022 to schedule colonoscopy with Dr. Valdivia February 2023.    To help with bowel prep, recommend the addition of magnesium citrate prior to MiraLAX bowel prep.  This can be purchased over-the-counter.  We will give directions regarding bowel prep when we contact you November 2022.    For constipation continue MiraLAX 1 capful twice daily.    For constipation, please check and see if you are taking Senokot 8.6/50, another name is Senexon-S 8.6-50 MG per tablet 1 pill twice daily.  If you are taking this medication 1 pill twice daily, increase to 2 pills twice daily to help with constipation.  This can be taken daily with MiraLAX.    If you are not taking this medication, please restart this medication as it has worked well in the past to help regulate your bowel movements.              EMR Dragon/Transcription Disclaimer:  This document has been Dictated utilizing Dragon dictation.

## 2022-06-27 DIAGNOSIS — K59.04 CHRONIC IDIOPATHIC CONSTIPATION: ICD-10-CM

## 2022-06-27 RX ORDER — POLYETHYLENE GLYCOL 3350 17 G/17G
POWDER, FOR SOLUTION ORAL
Qty: 510 G | Refills: 5 | Status: SHIPPED | OUTPATIENT
Start: 2022-06-27

## 2022-09-15 ENCOUNTER — TELEPHONE (OUTPATIENT)
Dept: GASTROENTEROLOGY | Facility: CLINIC | Age: 67
End: 2022-09-15

## 2022-11-10 ENCOUNTER — PREP FOR SURGERY (OUTPATIENT)
Dept: SURGERY | Facility: SURGERY CENTER | Age: 67
End: 2022-11-10

## 2022-11-10 DIAGNOSIS — Z86.010 HISTORY OF COLON POLYPS: ICD-10-CM

## 2022-11-10 DIAGNOSIS — Z12.11 ENCOUNTER FOR SCREENING FOR MALIGNANT NEOPLASM OF COLON: Primary | ICD-10-CM

## 2022-11-11 PROBLEM — Z86.0100 HISTORY OF COLON POLYPS: Status: ACTIVE | Noted: 2022-11-11

## 2022-11-11 PROBLEM — Z86.010 HISTORY OF COLON POLYPS: Status: ACTIVE | Noted: 2022-11-11

## 2022-11-11 RX ORDER — SODIUM CHLORIDE, SODIUM LACTATE, POTASSIUM CHLORIDE, CALCIUM CHLORIDE 600; 310; 30; 20 MG/100ML; MG/100ML; MG/100ML; MG/100ML
30 INJECTION, SOLUTION INTRAVENOUS CONTINUOUS PRN
Status: CANCELLED | OUTPATIENT
Start: 2022-11-11

## 2023-01-26 ENCOUNTER — TELEPHONE (OUTPATIENT)
Dept: GASTROENTEROLOGY | Facility: CLINIC | Age: 68
End: 2023-01-26
Payer: MEDICARE

## 2023-01-26 NOTE — TELEPHONE ENCOUNTER
Patient is requesting Prep instructions and has questions about CLS He is scheduled on .3-. Thank you

## 2023-02-02 ENCOUNTER — TELEPHONE (OUTPATIENT)
Dept: GASTROENTEROLOGY | Facility: CLINIC | Age: 68
End: 2023-02-02
Payer: MEDICARE

## 2023-03-14 ENCOUNTER — TELEPHONE (OUTPATIENT)
Dept: GASTROENTEROLOGY | Facility: CLINIC | Age: 68
End: 2023-03-14
Payer: MEDICARE

## 2023-03-14 NOTE — TELEPHONE ENCOUNTER
Patient called with concerns about his upcoming colonoscopy. Patient reports that he was treated for pneumonia at the beginning of March. Reports he has finished his antibiotics and is feeling better. Pt expressed worry about still having congestion for his upcoming colonoscopy. Please advise. EL

## 2023-03-15 ENCOUNTER — TELEPHONE (OUTPATIENT)
Dept: GASTROENTEROLOGY | Facility: CLINIC | Age: 68
End: 2023-03-15
Payer: MEDICARE

## 2023-03-15 NOTE — TELEPHONE ENCOUNTER
Patient called requesting to speak with Luba about Magnesium Citrate prep for upcoming CLS in May.  Patient stated that Luba was working on getting him some

## 2023-03-15 NOTE — TELEPHONE ENCOUNTER
Called and spoke with patient regarding colonoscopy reschedule. Patient verbalized understanding and was agreeable to postponing. Pt verified that he still resides at [facility] and will be using the medical taxi service to get to and from his procedure. Pt said he would call and verify once he has procedure date and time. Pt reports that his pharmacy has changed to [facility]. RN updated in chart. Forwarded patient information to schedulers to get procedure rescheduled and included additional bowel prep instructions. EL

## 2023-03-15 NOTE — TELEPHONE ENCOUNTER
Message reviewed.  Please thank patient for the message and yes, I agree, it seems very reasonable to postpone his colonoscopy 4 weeks or so to let him continue to heal from pneumonia.    Please make sure patient is okay with this and then let  know so that they can change appointment date and time.    Also patient resides at [facility] and takes medical taxi to and from his procedures.   He also needs an additional bottle of magnesium citrate prior to starting the bowel prep and needs to start bowel prep earlier in the day.  See office note April 12, 2022.   Please talk with me before you call him.  Thank you so much.  Linden

## 2023-03-16 NOTE — TELEPHONE ENCOUNTER
Returned call and patient will contact his pharmacy to see if they can get the magnesium in to add to his prep

## 2023-03-21 ENCOUNTER — TELEPHONE (OUTPATIENT)
Dept: GASTROENTEROLOGY | Facility: CLINIC | Age: 68
End: 2023-03-21
Payer: MEDICARE

## 2023-03-21 NOTE — TELEPHONE ENCOUNTER
Patient called and left voicemail on clinic line regarding his bowel prep and the magnesium citrate. RN returned patient call and was unable to leave voicemail to get more information. Will continue to try and reach. EL

## 2023-03-27 NOTE — TELEPHONE ENCOUNTER
He is scheduled May 17, 2023, I will need to send email to Catherine endoscopy supervisor with details prior to his procedure.  Linden

## 2023-03-29 NOTE — TELEPHONE ENCOUNTER
Need to call Juan with [facility] at [phone].    She is out of the office today, will call next week.  Patient does not have a guardian.    He makes his own medical decisions.     Need to confirm that [facility] employees would be able to meet the patient curbside after his procedure/anesthesia and be available to discuss results and recommendations after his procedure as they were able to do for his procedure in 2021.  Once this is confirmed, I will send email to Catherine.

## 2023-04-19 ENCOUNTER — INPATIENT HOSPITAL (OUTPATIENT)
Dept: URBAN - METROPOLITAN AREA HOSPITAL 107 | Facility: HOSPITAL | Age: 68
End: 2023-04-19

## 2023-04-19 DIAGNOSIS — D64.9 ANEMIA, UNSPECIFIED: ICD-10-CM

## 2023-04-19 DIAGNOSIS — K52.89 OTHER SPECIFIED NONINFECTIVE GASTROENTERITIS AND COLITIS: ICD-10-CM

## 2023-04-19 DIAGNOSIS — R93.3 ABNORMAL FINDINGS ON DIAGNOSTIC IMAGING OF OTHER PARTS OF DI: ICD-10-CM

## 2023-04-19 DIAGNOSIS — R19.7 DIARRHEA, UNSPECIFIED: ICD-10-CM

## 2023-04-19 DIAGNOSIS — K21.9 GASTRO-ESOPHAGEAL REFLUX DISEASE WITHOUT ESOPHAGITIS: ICD-10-CM

## 2023-04-19 DIAGNOSIS — R13.10 DYSPHAGIA, UNSPECIFIED: ICD-10-CM

## 2023-04-19 PROCEDURE — 99222 1ST HOSP IP/OBS MODERATE 55: CPT | Performed by: INTERNAL MEDICINE

## 2023-04-20 ENCOUNTER — INPATIENT HOSPITAL (OUTPATIENT)
Dept: URBAN - METROPOLITAN AREA HOSPITAL 107 | Facility: HOSPITAL | Age: 68
End: 2023-04-20

## 2023-04-20 DIAGNOSIS — R93.3 ABNORMAL FINDINGS ON DIAGNOSTIC IMAGING OF OTHER PARTS OF DI: ICD-10-CM

## 2023-04-20 DIAGNOSIS — K29.70 GASTRITIS, UNSPECIFIED, WITHOUT BLEEDING: ICD-10-CM

## 2023-04-20 DIAGNOSIS — R13.10 DYSPHAGIA, UNSPECIFIED: ICD-10-CM

## 2023-04-20 DIAGNOSIS — K22.2 ESOPHAGEAL OBSTRUCTION: ICD-10-CM

## 2023-04-20 DIAGNOSIS — R19.7 DIARRHEA, UNSPECIFIED: ICD-10-CM

## 2023-04-20 PROCEDURE — 45380 COLONOSCOPY AND BIOPSY: CPT | Performed by: INTERNAL MEDICINE

## 2023-04-20 PROCEDURE — 43249 ESOPH EGD DILATION <30 MM: CPT | Performed by: INTERNAL MEDICINE

## 2023-04-20 PROCEDURE — 43239 EGD BIOPSY SINGLE/MULTIPLE: CPT | Mod: 59 | Performed by: INTERNAL MEDICINE

## 2023-05-10 ENCOUNTER — PREP FOR SURGERY (OUTPATIENT)
Dept: SURGERY | Facility: SURGERY CENTER | Age: 68
End: 2023-05-10
Payer: MEDICARE

## 2023-05-10 DIAGNOSIS — Z86.010 HISTORY OF COLON POLYPS: Primary | ICD-10-CM

## 2023-05-10 DIAGNOSIS — Z12.11 ENCOUNTER FOR SCREENING FOR MALIGNANT NEOPLASM OF COLON: ICD-10-CM

## 2023-05-11 RX ORDER — SODIUM CHLORIDE, SODIUM LACTATE, POTASSIUM CHLORIDE, CALCIUM CHLORIDE 600; 310; 30; 20 MG/100ML; MG/100ML; MG/100ML; MG/100ML
30 INJECTION, SOLUTION INTRAVENOUS CONTINUOUS PRN
OUTPATIENT
Start: 2023-05-11

## 2023-05-12 ENCOUNTER — TELEPHONE (OUTPATIENT)
Dept: GASTROENTEROLOGY | Facility: CLINIC | Age: 68
End: 2023-05-12
Payer: MEDICARE

## 2023-05-12 NOTE — TELEPHONE ENCOUNTER
Spoke to the patient and nurse Rosalina to go over prep instructions and faxed prep to her as well @ [phone]

## 2023-05-12 NOTE — TELEPHONE ENCOUNTER
RN called patient to discuss and find out additional information regarding the patient complaints of possible post-colonoscopy issues and left voicemail. Direct contact information was given. EL

## 2023-05-17 ENCOUNTER — HOSPITAL ENCOUNTER (OUTPATIENT)
Facility: SURGERY CENTER | Age: 68
Setting detail: HOSPITAL OUTPATIENT SURGERY
Discharge: HOME | End: 2023-05-17
Attending: INTERNAL MEDICINE | Admitting: INTERNAL MEDICINE
Payer: MEDICARE

## 2023-05-17 ENCOUNTER — ANESTHESIA (OUTPATIENT)
Dept: SURGERY | Facility: SURGERY CENTER | Age: 68
End: 2023-05-17
Payer: MEDICARE

## 2023-05-17 ENCOUNTER — ANESTHESIA EVENT (OUTPATIENT)
Dept: SURGERY | Facility: SURGERY CENTER | Age: 68
End: 2023-05-17
Payer: MEDICARE

## 2023-05-17 VITALS
DIASTOLIC BLOOD PRESSURE: 76 MMHG | OXYGEN SATURATION: 100 % | BODY MASS INDEX: 20.51 KG/M2 | HEART RATE: 76 BPM | WEIGHT: 151.4 LBS | SYSTOLIC BLOOD PRESSURE: 104 MMHG | HEIGHT: 72 IN | RESPIRATION RATE: 16 BRPM | TEMPERATURE: 96.9 F

## 2023-05-17 DIAGNOSIS — Z86.010 HISTORY OF COLON POLYPS: ICD-10-CM

## 2023-05-17 DIAGNOSIS — Z12.11 ENCOUNTER FOR SCREENING FOR MALIGNANT NEOPLASM OF COLON: ICD-10-CM

## 2023-05-17 PROCEDURE — 45381 COLONOSCOPY SUBMUCOUS NJX: CPT | Performed by: INTERNAL MEDICINE

## 2023-05-17 PROCEDURE — 45380 COLONOSCOPY AND BIOPSY: CPT | Performed by: INTERNAL MEDICINE

## 2023-05-17 PROCEDURE — 25010000002 PROPOFOL 10 MG/ML EMULSION: Performed by: ANESTHESIOLOGY

## 2023-05-17 PROCEDURE — 88305 TISSUE EXAM BY PATHOLOGIST: CPT | Performed by: INTERNAL MEDICINE

## 2023-05-17 PROCEDURE — 45385 COLONOSCOPY W/LESION REMOVAL: CPT | Performed by: INTERNAL MEDICINE

## 2023-05-17 PROCEDURE — 0 LIDOCAINE 1 % SOLUTION: Performed by: ANESTHESIOLOGY

## 2023-05-17 DEVICE — REPLAY HEMOSTASIS CLIP, 16MM SPAN
Type: IMPLANTABLE DEVICE | Site: ASCENDING COLON | Status: FUNCTIONAL
Brand: REPLAY

## 2023-05-17 RX ORDER — LABETALOL HYDROCHLORIDE 5 MG/ML
5 INJECTION, SOLUTION INTRAVENOUS
Status: DISCONTINUED | OUTPATIENT
Start: 2023-05-17 | End: 2023-05-17 | Stop reason: HOSPADM

## 2023-05-17 RX ORDER — MAGNESIUM HYDROXIDE 1200 MG/15ML
LIQUID ORAL AS NEEDED
Status: DISCONTINUED | OUTPATIENT
Start: 2023-05-17 | End: 2023-05-17 | Stop reason: HOSPADM

## 2023-05-17 RX ORDER — LIDOCAINE HYDROCHLORIDE 10 MG/ML
INJECTION, SOLUTION INFILTRATION; PERINEURAL AS NEEDED
Status: DISCONTINUED | OUTPATIENT
Start: 2023-05-17 | End: 2023-05-17 | Stop reason: SURG

## 2023-05-17 RX ORDER — SODIUM CHLORIDE, SODIUM LACTATE, POTASSIUM CHLORIDE, CALCIUM CHLORIDE 600; 310; 30; 20 MG/100ML; MG/100ML; MG/100ML; MG/100ML
1000 INJECTION, SOLUTION INTRAVENOUS CONTINUOUS
Status: DISCONTINUED | OUTPATIENT
Start: 2023-05-17 | End: 2023-05-17 | Stop reason: HOSPADM

## 2023-05-17 RX ORDER — FENTANYL CITRATE 50 UG/ML
25 INJECTION, SOLUTION INTRAMUSCULAR; INTRAVENOUS
Status: DISCONTINUED | OUTPATIENT
Start: 2023-05-17 | End: 2023-05-17 | Stop reason: HOSPADM

## 2023-05-17 RX ORDER — PROPOFOL 10 MG/ML
VIAL (ML) INTRAVENOUS AS NEEDED
Status: DISCONTINUED | OUTPATIENT
Start: 2023-05-17 | End: 2023-05-17 | Stop reason: SURG

## 2023-05-17 RX ORDER — SODIUM CHLORIDE, SODIUM LACTATE, POTASSIUM CHLORIDE, CALCIUM CHLORIDE 600; 310; 30; 20 MG/100ML; MG/100ML; MG/100ML; MG/100ML
30 INJECTION, SOLUTION INTRAVENOUS CONTINUOUS PRN
Status: DISCONTINUED | OUTPATIENT
Start: 2023-05-17 | End: 2023-05-17 | Stop reason: HOSPADM

## 2023-05-17 RX ORDER — ONDANSETRON 2 MG/ML
4 INJECTION INTRAMUSCULAR; INTRAVENOUS ONCE AS NEEDED
Status: DISCONTINUED | OUTPATIENT
Start: 2023-05-17 | End: 2023-05-17 | Stop reason: HOSPADM

## 2023-05-17 RX ORDER — LIDOCAINE HYDROCHLORIDE 10 MG/ML
0.5 INJECTION, SOLUTION INFILTRATION; PERINEURAL ONCE AS NEEDED
Status: DISCONTINUED | OUTPATIENT
Start: 2023-05-17 | End: 2023-05-17 | Stop reason: HOSPADM

## 2023-05-17 RX ORDER — HYDRALAZINE HYDROCHLORIDE 20 MG/ML
10 INJECTION INTRAMUSCULAR; INTRAVENOUS
Status: DISCONTINUED | OUTPATIENT
Start: 2023-05-17 | End: 2023-05-17 | Stop reason: HOSPADM

## 2023-05-17 RX ADMIN — LIDOCAINE HYDROCHLORIDE 30 MG: 10 INJECTION, SOLUTION INFILTRATION; PERINEURAL at 12:05

## 2023-05-17 RX ADMIN — PROPOFOL 100 MG: 10 INJECTION, EMULSION INTRAVENOUS at 12:05

## 2023-05-17 RX ADMIN — PROPOFOL 160 MCG/KG/MIN: 10 INJECTION, EMULSION INTRAVENOUS at 12:05

## 2023-05-17 RX ADMIN — SODIUM CHLORIDE, POTASSIUM CHLORIDE, SODIUM LACTATE AND CALCIUM CHLORIDE 1000 ML: 600; 310; 30; 20 INJECTION, SOLUTION INTRAVENOUS at 11:26

## 2023-05-17 NOTE — ANESTHESIA PREPROCEDURE EVALUATION
Anesthesia Evaluation     no history of anesthetic complications:  NPO Solid Status: > 8 hours  NPO Liquid Status: > 2 hours           Airway   Mallampati: II  Neck ROM: full  no difficulty expected  Dental - normal exam     Pulmonary - normal exam   (+) a smoker Former,   (-) COPD, asthma, sleep apnea    PE comment: nonlabored  Cardiovascular - normal exam  Exercise tolerance: good (4-7 METS)    Rhythm: regular  Rate: normal    (+) hypertension, hyperlipidemia,   (-) valvular problems/murmurs, past MI, CAD, dysrhythmias, angina      Neuro/Psych  (+) psychiatric history (schizophrenia),    (-) seizures, TIA, CVA  GI/Hepatic/Renal/Endo    (+)  GERD,  renal disease (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        CODE STATUS:

## 2023-05-17 NOTE — ANESTHESIA POSTPROCEDURE EVALUATION
"Patient: Tervor Fox    Procedure Summary     Date: 05/17/23 Room / [facility]    Anesthesia Start: 1201 Anesthesia Stop: 1242    Procedure: COLONOSCOPY FOR SCREENING Diagnosis:       Encounter for screening for malignant neoplasm of colon      History of colon polyps      (Encounter for screening for malignant neoplasm of colon [Z12.11])      (History of colon polyps [Z86.010])    Surgeons: Ramon Valdivia MD Provider: Stefan Melchor MD    Anesthesia Type: MAC ASA Status: 3          Anesthesia Type: MAC    Vitals  Vitals Value Taken Time   BP 97/72 05/17/23 1241   Temp     Pulse 82 05/17/23 1241   Resp 16 05/17/23 1241   SpO2 100 % 05/17/23 1241           Post Anesthesia Care and Evaluation    Patient location during evaluation: bedside  Pain management: adequate    Airway patency: patent  Anesthetic complications: No anesthetic complications    Cardiovascular status: acceptable  Respiratory status: acceptable  Hydration status: acceptable    Comments: *BP 97/72 (BP Location: Left arm, Patient Position: Lying)   Pulse 82   Temp 36.1 °C (96.9 °F) (Temporal)   Resp 16   Ht 182.9 cm (72\")   Wt 68.7 kg (151 lb 6.4 oz)   SpO2 100%   BMI 20.53 kg/m²         "

## 2023-05-17 NOTE — H&P
No chief complaint on file.      HPI  Hx of polyps         Problem List:    Patient Active Problem List   Diagnosis   • Family history of colon cancer   • Polyp of colon   • Encounter for screening for malignant neoplasm of colon   • History of colon polyps       Medical History:    Past Medical History:   Diagnosis Date   • Anemia    • Chronic schizophrenia    • Enlarged prostate    • GERD (gastroesophageal reflux disease)    • Hyperlipidemia    • Hypertension    • Kidney disease     Stage 2   • Seasonal allergies         Social History:    Social History     Socioeconomic History   • Marital status:    Tobacco Use   • Smoking status: Former   • Smokeless tobacco: Former     Types: Chew   • Tobacco comments:     30 years ago   Vaping Use   • Vaping Use: Never used   Substance and Sexual Activity   • Alcohol use: Not Currently   • Drug use: Never   • Sexual activity: Defer       Family History:   Family History   Problem Relation Age of Onset   • Colon cancer Maternal Grandmother    • Colon polyps Neg Hx    • Crohn's disease Neg Hx    • Irritable bowel syndrome Neg Hx    • Ulcerative colitis Neg Hx        Surgical History:   Past Surgical History:   Procedure Laterality Date   • COLONOSCOPY     • COLONOSCOPY N/A 1/6/2022    Procedure: COLONOSCOPY FOR SCREENING;  Surgeon: Ramon Valdivia MD;  Location: [facility] OR;  Service: Gastroenterology;  Laterality: N/A;  fair prep, diverticulosis, melanosis, polyps, tattoo at hepatic flexure   • CYST REMOVAL     • ENDOSCOPY         No current facility-administered medications for this encounter.    Current Outpatient Medications:   •  Acetaminophen Extra Strength 500 MG tablet, TAKE ONE TABLET BY MOUTH THREE TIMES DAILY AS NEEDED NOT TO EXCEED 4GM ACETAMINOPHEN, Disp: , Rfl:   •  acetaminophen-codeine (TYLENOL #3) 300-30 MG per tablet, Take 0.5 tablets by mouth 2 (Two) Times a Day., Disp: , Rfl:   •  albuterol sulfate  (90 Base) MCG/ACT inhaler, INHALE ONE  OR TWO PUFFS BY MOUTH FOUR TIMES DAILY AS NEEDED, Disp: , Rfl:   •  Antacid Regular Strength 500 MG chewable tablet, TAKE TWO TABLETS BY MOUTH THREE TIMES DAILY AS NEEDED, Disp: , Rfl:   •  aspirin 325 MG EC tablet, Take 325 mg by mouth Daily., Disp: , Rfl:   •  atorvastatin (LIPITOR) 40 MG tablet, Take 40 mg by mouth every night at bedtime., Disp: , Rfl:   •  benztropine (COGENTIN) 1 MG tablet, Take 2 mg by mouth every night at bedtime., Disp: , Rfl:   •  busPIRone (BUSPAR) 10 MG tablet, Take 10 mg by mouth 2 (Two) Times a Day., Disp: , Rfl:   •  cetirizine (zyrTEC) 10 MG tablet, Take 10 mg by mouth Daily., Disp: , Rfl:   •  chlorhexidine (PERIDEX) 0.12 % solution, RINSE TWO TO THREE TIMES PER DAY AS DIRECTED BY YOUR DENTIST, Disp: , Rfl:   •  cloZAPine (CLOZARIL) 100 MG tablet, , Disp: , Rfl:   •  doxazosin (CARDURA) 4 MG tablet, Take 4 mg by mouth every night at bedtime., Disp: , Rfl:   •  ferrous sulfate 325 (65 Fe) MG tablet, Take 1 tablet by mouth 2 (Two) Times a Day., Disp: , Rfl:   •  finasteride (PROSCAR) 5 MG tablet, Take 5 mg by mouth Daily., Disp: , Rfl:   •  fluticasone (FLONASE) 50 MCG/ACT nasal spray, INSTILL TWO SPRAYS in EACH nostril ONCE daily, Disp: , Rfl:   •  gabapentin (NEURONTIN) 300 MG capsule, , Disp: , Rfl:   •  GaviLAX 17 GM/SCOOP powder, Take 17 g by mouth 2 (Two) Times a Day., Disp: 510 g, Rfl: 5  •  hydrALAZINE (APRESOLINE) 50 MG tablet, Take 50 mg by mouth 2 (Two) Times a Day., Disp: , Rfl:   •  Incruse Ellipta 62.5 MCG/INH aerosol powder , INHALE ONE PUFF BY MOUTH AT BEDTIME, Disp: , Rfl:   •  ketoconazole (NIZORAL) 2 % shampoo, , Disp: , Rfl:   •  meclizine (ANTIVERT) 25 MG tablet, Take 25 mg by mouth Daily As Needed., Disp: , Rfl:   •  metoprolol tartrate (LOPRESSOR) 25 MG tablet, Take 25 mg by mouth Daily., Disp: , Rfl:   •  montelukast (SINGULAIR) 10 MG tablet, Take 10 mg by mouth Daily., Disp: , Rfl:   •  multivitamin tablet, Take 1 tablet by mouth Daily., Disp: , Rfl:   •   omeprazole (priLOSEC) 40 MG capsule, Take 40 mg by mouth Daily., Disp: , Rfl:   •  Senexon-S 8.6-50 MG per tablet, Take 1 tablet by mouth 2 (Two) Times a Day., Disp: , Rfl:   •  sucralfate (CARAFATE) 1 g tablet, Take 1 g by mouth 3 (Three) Times a Day., Disp: , Rfl:   •  Symbicort 80-4.5 MCG/ACT inhaler, Inhale 2 puffs 2 (Two) Times a Day., Disp: , Rfl:     Allergies: No Known Allergies     The following portions of the patient's history were reviewed by me and updated as appropriate: review of systems, allergies, current medications, past family history, past medical history, past social history, past surgical history and problem list.    There were no vitals filed for this visit.    PHYSICAL EXAM:    CONSTITUTIONAL:  today's vital signs reviewed by me  GASTROINTESTINAL: abdomen is soft nontender nondistended with normal active bowel sounds, no masses are appreciated    Assessment/ Plan  Hx of polyps    colonoscopy    Risks and benefits as well as alternatives to endoscopic evaluation were explained to the patient and they voiced understanding and wish to proceed.  These risks include but are not limited to the risk of bleeding, perforation, adverse reaction to sedation, and missed lesions.  The patient was given the opportunity to ask questions prior to the endoscopic procedure.

## 2023-05-17 NOTE — DISCHARGE INSTRUCTIONS
__2_____ clip(s) was placed in your body on ____5/17/2023__________________ to control bleeding in your GI tract.  If scheduled for an MRI, please inform the technologist you should have an X-ray prior to your MRI to confirm the metal clip has passed.   2

## 2023-08-23 ENCOUNTER — TELEPHONE (OUTPATIENT)
Dept: GASTROENTEROLOGY | Facility: CLINIC | Age: 68
End: 2023-08-23
Payer: MEDICARE

## 2023-08-23 NOTE — TELEPHONE ENCOUNTER
Patient had clips put in during his colonoscopy on 05/17/23.  Patient is needing to schedule an MRI but cannot until the clips are removed.  Please call patient to advise

## 2023-08-23 NOTE — TELEPHONE ENCOUNTER
Reviewed [employer] information for RePlay hemostasis clip 117 0-0 2, patient had 2 clips applied during colonoscopy.  MRI safety information relayed to patient, the clips are MRI conditional.  Patients with this device can be scanned safely in an MRI under the following conditions:  Static magnetic field of 1.5T or 3.0T.  Maximum special field gradient of 2000 gauss/cm (20 T/m)  Maximum MR system reported, whole-body average specific absorption rate of 2W/KG (normal operating mode).    Patient is not scheduled for an MRI but was wondering about an MRI if he needs one in the future, again he has no plans or orders for specific MRI.  I have confirmed and patient has in front of him the document he was given at the time of his colonoscopy with the MRI safety information and MR conditional information on it.  Patient was instructed to keep this information and if he should need an MRI, he will share it with the ordering provider and facility and determine if the following conditions are met or if he needs an abdominal x-ray to see if the clip has passed otherwise I do not recommend abdominal x-ray with exposure to radiation at this time as patient does not have MRI scheduled and no plans for upcoming MRI.    Patient verbalized agreement and understanding, all questions answered.  Linden

## 2023-12-27 ENCOUNTER — TELEPHONE (OUTPATIENT)
Dept: GASTROENTEROLOGY | Facility: CLINIC | Age: 68
End: 2023-12-27

## 2023-12-27 NOTE — TELEPHONE ENCOUNTER
Caller:  ELIU SERRATO    Relationship: SELF    Best call back number: [phone]    What is your medical concern? PT WAS IN ER FOR A FEW DAYS WITH SMALL BOWEL BLOCKAGE. IS FEELING BETTER NOW, BUT WANTED OFFICE TO KNOW IN CASE HE NEEDS TO BE SEEN BEFORE COLONOSCOPY IN MAY.     How long has this issue been going on? 12.24    Is your provider already aware of this issue?     Have you been treated for this issue?

## 2024-01-02 ENCOUNTER — OFFICE VISIT (OUTPATIENT)
Dept: GASTROENTEROLOGY | Facility: CLINIC | Age: 69
End: 2024-01-02
Payer: MEDICAID

## 2024-01-02 ENCOUNTER — PREP FOR SURGERY (OUTPATIENT)
Dept: SURGERY | Facility: SURGERY CENTER | Age: 69
End: 2024-01-02
Payer: MEDICAID

## 2024-01-02 VITALS
HEART RATE: 97 BPM | SYSTOLIC BLOOD PRESSURE: 110 MMHG | TEMPERATURE: 97 F | HEIGHT: 72 IN | WEIGHT: 162.1 LBS | BODY MASS INDEX: 21.96 KG/M2 | OXYGEN SATURATION: 96 % | DIASTOLIC BLOOD PRESSURE: 70 MMHG

## 2024-01-02 DIAGNOSIS — Z86.010 HISTORY OF COLON POLYPS: ICD-10-CM

## 2024-01-02 DIAGNOSIS — Z12.11 SCREENING FOR MALIGNANT NEOPLASM OF COLON: ICD-10-CM

## 2024-01-02 DIAGNOSIS — Z80.0 FAMILY HISTORY OF COLON CANCER: ICD-10-CM

## 2024-01-02 DIAGNOSIS — K59.04 CHRONIC IDIOPATHIC CONSTIPATION: Primary | ICD-10-CM

## 2024-01-02 DIAGNOSIS — Z12.11 ENCOUNTER FOR SCREENING FOR MALIGNANT NEOPLASM OF COLON: Primary | ICD-10-CM

## 2024-01-02 DIAGNOSIS — K56.609 SMALL BOWEL OBSTRUCTION: ICD-10-CM

## 2024-01-02 RX ORDER — MAGNESIUM HYDROXIDE 1200 MG/15ML
SUSPENSION ORAL
COMMUNITY
Start: 2023-12-23

## 2024-01-02 RX ORDER — TIOTROPIUM BROMIDE INHALATION SPRAY 3.12 UG/1
2 SPRAY, METERED RESPIRATORY (INHALATION) DAILY
COMMUNITY

## 2024-01-02 RX ORDER — BENZONATATE 100 MG/1
CAPSULE, LIQUID FILLED ORAL
COMMUNITY
Start: 2023-11-24

## 2024-01-02 RX ORDER — SODIUM CHLORIDE 0.9 % (FLUSH) 0.9 %
3 SYRINGE (ML) INJECTION EVERY 12 HOURS SCHEDULED
OUTPATIENT
Start: 2024-01-02

## 2024-01-02 RX ORDER — MINERAL OIL 999 MG/ML
LIQUID ORAL; TOPICAL
COMMUNITY
Start: 2023-12-23

## 2024-01-02 RX ORDER — SODIUM CHLORIDE, SODIUM LACTATE, POTASSIUM CHLORIDE, CALCIUM CHLORIDE 600; 310; 30; 20 MG/100ML; MG/100ML; MG/100ML; MG/100ML
30 INJECTION, SOLUTION INTRAVENOUS CONTINUOUS PRN
OUTPATIENT
Start: 2024-01-02

## 2024-01-02 RX ORDER — LACTULOSE 10 G/15ML
10 SOLUTION ORAL DAILY
Qty: 450 ML | Refills: 5 | Status: SHIPPED | OUTPATIENT
Start: 2024-01-02

## 2024-01-02 RX ORDER — MIRTAZAPINE 7.5 MG/1
7.5 TABLET, FILM COATED ORAL DAILY
COMMUNITY

## 2024-01-02 RX ORDER — LACTULOSE 10 G/15ML
SOLUTION ORAL
COMMUNITY
Start: 2023-12-23 | End: 2024-01-02 | Stop reason: SDUPTHER

## 2024-01-02 RX ORDER — MELATONIN
COMMUNITY
Start: 2023-11-24

## 2024-01-02 RX ORDER — SODIUM CHLORIDE 0.9 % (FLUSH) 0.9 %
10 SYRINGE (ML) INJECTION AS NEEDED
OUTPATIENT
Start: 2024-01-02

## 2024-01-02 NOTE — PATIENT INSTRUCTIONS
Start lactulose 15 ml once daily for constipation    If diarrhea and urgency persists, decrease lactulose to every other day  We can increase dose of lactulose for daily use as well if it does not promote more regular BM at lower dose    Continue miralax and stool softener daily    Goal is 3-4 BM per week or more frequently if regimen dictates  But do not want accidents    Schedule colonoscopy May 2024  Two day prep

## 2024-05-16 ENCOUNTER — TELEPHONE (OUTPATIENT)
Dept: GASTROENTEROLOGY | Facility: CLINIC | Age: 69
End: 2024-05-16

## 2024-05-16 ENCOUNTER — TELEPHONE (OUTPATIENT)
Dept: GASTROENTEROLOGY | Facility: CLINIC | Age: 69
End: 2024-05-16
Payer: MEDICARE

## 2024-05-16 NOTE — TELEPHONE ENCOUNTER
Hub staff attempted to follow warm transfer process and was unsuccessful     Caller: Trevor Fox    Relationship to patient: Self    Best call back number: [phone]     Patient is needing: PT IS CALLING BECAUSE HE FORGOT TO ASK WHEN HE NEEDS TO STOP TAKING HIS TYLENOL WITH CODINE. PT HAS PROCEDURE WITH DR. RUTH ON 05/20/24

## 2024-05-16 NOTE — TELEPHONE ENCOUNTER
Hub staff attempted to follow warm transfer process and was unsuccessful     Caller: ELIU SERRATO    Relationship to patient: SELF    Best call back number: [phone]    Patient is needing: PT WAS SPEAKING WITH SOMEONE ABOUT THE MEDS THAT HE TAKES AND WHAT HE SHOULDN'T BE TAKING PRIOR TO COLONOSCOPY. PT WANTED THEM TO KNOW THAT HE DOES TAKE TYLENOL WITH CODEINE.

## 2024-05-16 NOTE — TELEPHONE ENCOUNTER
RN returned call to patient and discussed stopping tylenol with codeine  about 5 days prior to colonoscopy. Pt verbalized understanding and is agreeable. EL

## 2024-05-20 ENCOUNTER — HOSPITAL ENCOUNTER (OUTPATIENT)
Facility: SURGERY CENTER | Age: 69
Setting detail: HOSPITAL OUTPATIENT SURGERY
Discharge: HOME | End: 2024-05-20
Attending: INTERNAL MEDICINE | Admitting: INTERNAL MEDICINE
Payer: MEDICARE

## 2024-05-20 ENCOUNTER — ANESTHESIA EVENT (OUTPATIENT)
Dept: SURGERY | Facility: SURGERY CENTER | Age: 69
End: 2024-05-20
Payer: MEDICARE

## 2024-05-20 ENCOUNTER — ANESTHESIA (OUTPATIENT)
Dept: SURGERY | Facility: SURGERY CENTER | Age: 69
End: 2024-05-20
Payer: MEDICARE

## 2024-05-20 VITALS
TEMPERATURE: 97.7 F | RESPIRATION RATE: 12 BRPM | HEART RATE: 72 BPM | OXYGEN SATURATION: 98 % | DIASTOLIC BLOOD PRESSURE: 78 MMHG | HEIGHT: 72 IN | SYSTOLIC BLOOD PRESSURE: 113 MMHG | WEIGHT: 148.6 LBS | BODY MASS INDEX: 20.13 KG/M2

## 2024-05-20 DIAGNOSIS — Z86.010 HISTORY OF COLON POLYPS: ICD-10-CM

## 2024-05-20 DIAGNOSIS — Z80.0 FAMILY HISTORY OF COLON CANCER: ICD-10-CM

## 2024-05-20 DIAGNOSIS — Z12.11 SCREENING FOR MALIGNANT NEOPLASM OF COLON: ICD-10-CM

## 2024-05-20 DIAGNOSIS — Z12.11 ENCOUNTER FOR SCREENING FOR MALIGNANT NEOPLASM OF COLON: ICD-10-CM

## 2024-05-20 PROCEDURE — 25010000002 PROPOFOL 1000 MG/100ML EMULSION: Performed by: NURSE ANESTHETIST, CERTIFIED REGISTERED

## 2024-05-20 PROCEDURE — 45385 COLONOSCOPY W/LESION REMOVAL: CPT | Performed by: INTERNAL MEDICINE

## 2024-05-20 PROCEDURE — 45380 COLONOSCOPY AND BIOPSY: CPT | Performed by: INTERNAL MEDICINE

## 2024-05-20 PROCEDURE — 25010000002 PROPOFOL 10 MG/ML EMULSION: Performed by: NURSE ANESTHETIST, CERTIFIED REGISTERED

## 2024-05-20 PROCEDURE — 88305 TISSUE EXAM BY PATHOLOGIST: CPT | Performed by: INTERNAL MEDICINE

## 2024-05-20 PROCEDURE — 25010000002 LIDOCAINE 1 % SOLUTION: Performed by: NURSE ANESTHETIST, CERTIFIED REGISTERED

## 2024-05-20 PROCEDURE — 25810000003 LACTATED RINGERS PER 1000 ML: Performed by: NURSE ANESTHETIST, CERTIFIED REGISTERED

## 2024-05-20 RX ORDER — SODIUM CHLORIDE 0.9 % (FLUSH) 0.9 %
10 SYRINGE (ML) INJECTION AS NEEDED
Status: DISCONTINUED | OUTPATIENT
Start: 2024-05-20 | End: 2024-05-20 | Stop reason: HOSPADM

## 2024-05-20 RX ORDER — SODIUM CHLORIDE, SODIUM LACTATE, POTASSIUM CHLORIDE, CALCIUM CHLORIDE 600; 310; 30; 20 MG/100ML; MG/100ML; MG/100ML; MG/100ML
30 INJECTION, SOLUTION INTRAVENOUS CONTINUOUS PRN
Status: DISCONTINUED | OUTPATIENT
Start: 2024-05-20 | End: 2024-05-20 | Stop reason: HOSPADM

## 2024-05-20 RX ORDER — PROPOFOL 10 MG/ML
INJECTION, EMULSION INTRAVENOUS AS NEEDED
Status: DISCONTINUED | OUTPATIENT
Start: 2024-05-20 | End: 2024-05-20 | Stop reason: SURG

## 2024-05-20 RX ORDER — LIDOCAINE HYDROCHLORIDE 10 MG/ML
INJECTION, SOLUTION INFILTRATION; PERINEURAL AS NEEDED
Status: DISCONTINUED | OUTPATIENT
Start: 2024-05-20 | End: 2024-05-20 | Stop reason: SURG

## 2024-05-20 RX ORDER — SODIUM CHLORIDE, SODIUM LACTATE, POTASSIUM CHLORIDE, CALCIUM CHLORIDE 600; 310; 30; 20 MG/100ML; MG/100ML; MG/100ML; MG/100ML
INJECTION, SOLUTION INTRAVENOUS CONTINUOUS PRN
Status: DISCONTINUED | OUTPATIENT
Start: 2024-05-20 | End: 2024-05-20 | Stop reason: SURG

## 2024-05-20 RX ORDER — SODIUM CHLORIDE 0.9 % (FLUSH) 0.9 %
3 SYRINGE (ML) INJECTION EVERY 12 HOURS SCHEDULED
Status: DISCONTINUED | OUTPATIENT
Start: 2024-05-20 | End: 2024-05-20 | Stop reason: HOSPADM

## 2024-05-20 RX ADMIN — SODIUM CHLORIDE, POTASSIUM CHLORIDE, SODIUM LACTATE AND CALCIUM CHLORIDE: 600; 310; 30; 20 INJECTION, SOLUTION INTRAVENOUS at 09:05

## 2024-05-20 RX ADMIN — PROPOFOL 200 MCG/KG/MIN: 10 INJECTION, EMULSION INTRAVENOUS at 09:06

## 2024-05-20 RX ADMIN — LIDOCAINE HYDROCHLORIDE 50 MG: 10 INJECTION, SOLUTION INFILTRATION; PERINEURAL at 09:06

## 2024-05-20 RX ADMIN — PROPOFOL 60 MG: 10 INJECTION, EMULSION INTRAVENOUS at 09:06

## 2024-05-20 NOTE — ANESTHESIA PREPROCEDURE EVALUATION
Anesthesia Evaluation     Patient summary reviewed and Nursing notes reviewed   no history of anesthetic complications:   NPO Solid Status: > 8 hours  NPO Liquid Status: > 8 hours           Airway   Mallampati: II  TM distance: >3 FB  Neck ROM: full  No difficulty expected  Dental - normal exam     Pulmonary    (+) a smoker Former, COPD moderate,  (-) asthma, sleep apnea, rhonchi, decreased breath sounds, wheezes  Cardiovascular   Exercise tolerance: good (4-7 METS)    Rhythm: regular  Rate: normal    (+) hypertension, CAD (cath last year non obstructive CAD), hyperlipidemia  (-) dysrhythmias, angina, WORKMAN, murmur, cardiac stents      Neuro/Psych  (+) psychiatric history Anxiety and Schizophrenia  (-) seizures, CVA  GI/Hepatic/Renal/Endo    (+) GERD, renal disease- CRI  (-) liver disease, diabetes, no thyroid disorder    Musculoskeletal     Abdominal     Abdomen: soft.   Substance History      OB/GYN          Other   arthritis,                 Anesthesia Plan    ASA 3     MAC   total IV anesthesia  intravenous induction     Anesthetic plan, risks, benefits, and alternatives have been provided, discussed and informed consent has been obtained with: patient.    CODE STATUS:

## 2024-05-20 NOTE — H&P
No chief complaint on file.      HPI  H/o polyps  [facility]         Problem List:    Patient Active Problem List   Diagnosis    Family history of colon cancer    Polyp of colon    Encounter for screening for malignant neoplasm of colon    History of colon polyps    Screening for malignant neoplasm of colon       Medical History:    Past Medical History:   Diagnosis Date    Anemia     Arthritis     Chronic schizophrenia     COPD (chronic obstructive pulmonary disease)     Enlarged prostate     GERD (gastroesophageal reflux disease)     Hyperlipidemia     Hypertension     Kidney disease     Stage 2    Seasonal allergies         Social History:    Social History     Socioeconomic History    Marital status:    Tobacco Use    Smoking status: Former    Smokeless tobacco: Former     Types: Chew    Tobacco comments:     30 years ago   Vaping Use    Vaping status: Never Used   Substance and Sexual Activity    Alcohol use: Not Currently    Drug use: Never    Sexual activity: Defer       Family History:   Family History   Problem Relation Age of Onset    Colon cancer Maternal Grandmother     Colon polyps Neg Hx     Crohn's disease Neg Hx     Irritable bowel syndrome Neg Hx     Ulcerative colitis Neg Hx        Surgical History:   Past Surgical History:   Procedure Laterality Date    COLONOSCOPY      COLONOSCOPY N/A 1/6/2022    Procedure: COLONOSCOPY FOR SCREENING;  Surgeon: Ramon Valdivia MD;  Location: [facility] [place] [facility] OR;  Service: Gastroenterology;  Laterality: N/A;  fair prep, diverticulosis, melanosis, polyps, tattoo at hepatic flexure    COLONOSCOPY N/A 5/17/2023    Procedure: COLONOSCOPY FOR SCREENING;  Surgeon: Ramon Valdivia MD;  Location: [facility] [place] [facility] OR;  Service: Gastroenterology;  Laterality: N/A;   proximal and distal ascending colon polyp transverse colon polyp  descending colon polyp sigmoid colon polyp     adequate prep    CYST REMOVAL      ENDOSCOPY         No current facility-administered medications for  this encounter.    Current Outpatient Medications:     Acetaminophen Extra Strength 500 MG tablet, TAKE ONE TABLET BY MOUTH THREE TIMES DAILY AS NEEDED NOT TO EXCEED 4GM ACETAMINOPHEN, Disp: , Rfl:     acetaminophen-codeine (TYLENOL #3) 300-30 MG per tablet, Take 0.5 tablets by mouth 2 (Two) Times a Day., Disp: , Rfl:     albuterol sulfate  (90 Base) MCG/ACT inhaler, INHALE ONE OR TWO PUFFS BY MOUTH FOUR TIMES DAILY AS NEEDED, Disp: , Rfl:     Antacid Regular Strength 500 MG chewable tablet, TAKE TWO TABLETS BY MOUTH THREE TIMES DAILY AS NEEDED, Disp: , Rfl:     aspirin 325 MG EC tablet, Take 1 tablet by mouth Daily., Disp: , Rfl:     atorvastatin (LIPITOR) 40 MG tablet, Take 1 tablet by mouth every night at bedtime., Disp: , Rfl:     benztropine (COGENTIN) 1 MG tablet, Take 2 tablets by mouth every night at bedtime., Disp: , Rfl:     busPIRone (BUSPAR) 10 MG tablet, Take 1 tablet by mouth 2 (Two) Times a Day., Disp: , Rfl:     cetirizine (zyrTEC) 10 MG tablet, Take 1 tablet by mouth Daily., Disp: , Rfl:     Chest Congestion Relief 400 MG tablet, , Disp: , Rfl:     chlorhexidine (PERIDEX) 0.12 % solution, RINSE TWO TO THREE TIMES PER DAY AS DIRECTED BY YOUR DENTIST, Disp: , Rfl:     Cholecalciferol 25 MCG (1000 UT) tablet, , Disp: , Rfl:     cloZAPine (CLOZARIL) 100 MG tablet, , Disp: , Rfl:     doxazosin (CARDURA) 4 MG tablet, Take 1 tablet by mouth every night at bedtime., Disp: , Rfl:     ferrous sulfate 325 (65 Fe) MG tablet, Take 1 tablet by mouth 2 (Two) Times a Day., Disp: , Rfl:     finasteride (PROSCAR) 5 MG tablet, Take 1 tablet by mouth Daily., Disp: , Rfl:     fluticasone (FLONASE) 50 MCG/ACT nasal spray, INSTILL TWO SPRAYS in EACH nostril ONCE daily, Disp: , Rfl:     gabapentin (NEURONTIN) 300 MG capsule, , Disp: , Rfl:     GaviLAX 17 GM/SCOOP powder, Take 17 g by mouth 2 (Two) Times a Day., Disp: 510 g, Rfl: 5    GoodSense Mineral Oil liquid, , Disp: , Rfl:     hydrALAZINE (APRESOLINE) 50 MG  tablet, Take 1 tablet by mouth 2 (Two) Times a Day., Disp: , Rfl:     Incruse Ellipta 62.5 MCG/INH aerosol powder , INHALE ONE PUFF BY MOUTH AT BEDTIME, Disp: , Rfl:     ketoconazole (NIZORAL) 2 % shampoo, , Disp: , Rfl:     lactulose (CHRONULAC) 10 GM/15ML solution, Take 15 mL by mouth Daily., Disp: 450 mL, Rfl: 5    meclizine (ANTIVERT) 25 MG tablet, Take 1 tablet by mouth Daily As Needed., Disp: , Rfl:     metoprolol tartrate (LOPRESSOR) 25 MG tablet, Take 1 tablet by mouth Daily., Disp: , Rfl:     Milk of Magnesia 400 MG/5ML suspension, , Disp: , Rfl:     mirtazapine (REMERON) 7.5 MG tablet, Take 1 tablet by mouth Daily., Disp: , Rfl:     montelukast (SINGULAIR) 10 MG tablet, Take 1 tablet by mouth Daily., Disp: , Rfl:     multivitamin tablet, Take 1 tablet by mouth Daily., Disp: , Rfl:     omeprazole (priLOSEC) 40 MG capsule, Take 1 capsule by mouth Daily., Disp: , Rfl:     Senexon-S 8.6-50 MG per tablet, Take 1 tablet by mouth 2 (Two) Times a Day., Disp: , Rfl:     Spiriva Respimat 2.5 MCG/ACT aerosol solution inhaler, Inhale 2 puffs Daily., Disp: , Rfl:     sucralfate (CARAFATE) 1 g tablet, Take 1 tablet by mouth 3 (Three) Times a Day., Disp: , Rfl:     Symbicort 80-4.5 MCG/ACT inhaler, Inhale 2 puffs 2 (Two) Times a Day., Disp: , Rfl:     Tab-A-Arpita/Iron/Beta Carotene tablet tablet, , Disp: , Rfl:     Allergies: No Known Allergies     The following portions of the patient's history were reviewed by me and updated as appropriate: review of systems, allergies, current medications, past family history, past medical history, past social history, past surgical history and problem list.    There were no vitals filed for this visit.    PHYSICAL EXAM:    CONSTITUTIONAL:  today's vital signs reviewed by me  GASTROINTESTINAL: abdomen is soft nontender nondistended with normal active bowel sounds, no masses are appreciated    Assessment/ Plan  H/o polyps  [facility] cr[facility]    colonoscopy    Risks and benefits as well as  alternatives to endoscopic evaluation were explained to the patient and they voiced understanding and wish to proceed.  These risks include but are not limited to the risk of bleeding, perforation, adverse reaction to sedation, and missed lesions.  The patient was given the opportunity to ask questions prior to the endoscopic procedure.

## 2024-05-20 NOTE — ANESTHESIA POSTPROCEDURE EVALUATION
"Patient: Trevor Fox    Procedure Summary       Date: 05/20/24 [facility]    Anesthesia Start: 0905 Anesthesia Stop: 0941    Procedure: COLONOSCOPY WITH POLYPECTOMY Diagnosis:       Encounter for screening for malignant neoplasm of colon      Family history of colon cancer      History of colon polyps      Screening for malignant neoplasm of colon      (Encounter for screening for malignant neoplasm of colon [Z12.11])      (Family history of colon cancer [Z80.0])      (History of colon polyps [Z86.010])      (Screening for malignant neoplasm of colon [Z12.11])    Surgeons: Ramon Valdivia MD Provider: Oswald Jensen MD    Anesthesia Type: MAC ASA Status: 3            Anesthesia Type: MAC    Vitals  Vitals Value Taken Time   /78 05/20/24 1000   Temp 36.5 °C (97.7 °F) 05/20/24 0940   Pulse 72 05/20/24 1000   Resp 12 05/20/24 0950   SpO2 98 % 05/20/24 1000           Post Anesthesia Care and Evaluation    Level of consciousness: awake and alert  Pain management: adequate    Airway patency: patent  Anesthetic complications: No anesthetic complications  PONV Status: none  Cardiovascular status: acceptable  Respiratory status: acceptable  Hydration status: acceptable    Comments: /78 (BP Location: Left arm, Patient Position: Lying)   Pulse 72   Temp 36.5 °C (97.7 °F) (Temporal)   Resp 12   Ht 182.9 cm (72\")   Wt 67.4 kg (148 lb 9.6 oz)   SpO2 98%   BMI 20.15 kg/m²       "

## 2024-05-21 LAB
LAB AP CASE REPORT: NORMAL
PATH REPORT.FINAL DX SPEC: NORMAL
PATH REPORT.GROSS SPEC: NORMAL

## 2024-12-19 NOTE — TELEPHONE ENCOUNTER
Last appt   10/7/2024   Next appt   1/10/2025     Medication is active on current medication list.      Tried to leave a message no vm

## 2025-01-10 ENCOUNTER — TELEPHONE (OUTPATIENT)
Dept: GASTROENTEROLOGY | Facility: CLINIC | Age: 70
End: 2025-01-10

## 2025-01-10 NOTE — TELEPHONE ENCOUNTER
Hub staff attempted to follow warm transfer process and was unsuccessful     Caller: [facility]    Relationship to patient: CLIENT    Best call back number: [phone]    Patient is needing: PT WAS IN THE ER YESTERDAY FOR URINE TRACT INFECTION, ENLARGED SPINE AND LIVER. PT OF DR. RUTH, NEEDS TO BE SCHEDULED IN. PLEASE CALL NURSING HOME  TO GET PT SCHEDULED IN.

## 2025-04-08 ENCOUNTER — TELEPHONE (OUTPATIENT)
Dept: GASTROENTEROLOGY | Facility: CLINIC | Age: 70
End: 2025-04-08
Payer: MEDICARE

## 2025-04-08 NOTE — TELEPHONE ENCOUNTER
Hub staff attempted to follow warm transfer process and was unsuccessful     Caller: Trevor Fox    Relationship to patient: Self    Best call back number: [phone]    Patient is needing: PATIENT NEEDING TO SCHEDULE SCOPE.  PLEASE REACH OUT TO SCHEDULE. THANK YOU

## 2025-04-16 ENCOUNTER — PREP FOR SURGERY (OUTPATIENT)
Dept: SURGERY | Facility: SURGERY CENTER | Age: 70
End: 2025-04-16
Payer: MEDICARE

## 2025-04-16 DIAGNOSIS — Z12.11 ENCOUNTER FOR SCREENING FOR MALIGNANT NEOPLASM OF COLON: ICD-10-CM

## 2025-04-16 DIAGNOSIS — Z80.0 FAMILY HISTORY OF COLON CANCER: ICD-10-CM

## 2025-04-16 DIAGNOSIS — Z86.0101 HISTORY OF ADENOMATOUS AND SERRATED COLON POLYPS: Primary | ICD-10-CM

## 2025-04-17 PROBLEM — Z86.0101 HISTORY OF ADENOMATOUS AND SERRATED COLON POLYPS: Status: ACTIVE | Noted: 2025-04-16

## 2025-04-17 RX ORDER — SODIUM CHLORIDE 0.9 % (FLUSH) 0.9 %
3 SYRINGE (ML) INJECTION EVERY 12 HOURS SCHEDULED
OUTPATIENT
Start: 2025-04-17

## 2025-04-17 RX ORDER — SODIUM CHLORIDE 0.9 % (FLUSH) 0.9 %
10 SYRINGE (ML) INJECTION AS NEEDED
OUTPATIENT
Start: 2025-04-17

## 2025-04-17 RX ORDER — SODIUM CHLORIDE, SODIUM LACTATE, POTASSIUM CHLORIDE, CALCIUM CHLORIDE 600; 310; 30; 20 MG/100ML; MG/100ML; MG/100ML; MG/100ML
30 INJECTION, SOLUTION INTRAVENOUS CONTINUOUS PRN
OUTPATIENT
Start: 2025-04-17 | End: 2025-04-17

## 2025-04-30 ENCOUNTER — TELEPHONE (OUTPATIENT)
Dept: GASTROENTEROLOGY | Facility: CLINIC | Age: 70
End: 2025-04-30
Payer: MEDICARE

## 2025-04-30 NOTE — TELEPHONE ENCOUNTER
Hub staff attempted to follow warm transfer process and was unsuccessful     Caller: Trevor Fox    Relationship to patient: Self    Best call back number: [phone]      Patient is needing: PT NEEDS THE PREP INSTRUCTIONS MAILED TO HIM AGAIN PLEASE. PROCEDURE 6-3-25.

## 2025-05-13 ENCOUNTER — TELEPHONE (OUTPATIENT)
Dept: GASTROENTEROLOGY | Facility: CLINIC | Age: 70
End: 2025-05-13
Payer: MEDICARE

## 2025-05-13 NOTE — TELEPHONE ENCOUNTER
Hub staff attempted to follow warm transfer process and was unsuccessful     Caller: Trevor Fox    Relationship to patient: Self    Best call back number: [phone]    Patient is needing: PT NEEDS PREP INSTRUCTIONS FOR 06/03 C-SCOPE.  PLEASE MAIL THEM OUT AND THEN CALL HIM TO KNOW WHEN IT WAS MAILED SO HE CAN BE WATCHING FOR IT.

## 2025-05-21 ENCOUNTER — TELEPHONE (OUTPATIENT)
Dept: GASTROENTEROLOGY | Facility: CLINIC | Age: 70
End: 2025-05-21
Payer: MEDICARE

## 2025-05-21 NOTE — TELEPHONE ENCOUNTER
Caller: Trevor Fox    Relationship to patient: Self    Best call back number: [phone]     Patient is needing: PATIENT IS SCHEDULED FOR A COLONOSCOPY ON 6/3/25 BUT STATES HE DOES NOT HAVE HIS PREP INSTRUCTIONS.  HE STATES HE DOES NOT HAVE ACCESS TO [employer] SO NEEDS THESE MAILED TO HIS PHYSICAL ADDRESS.

## 2025-05-23 ENCOUNTER — TELEPHONE (OUTPATIENT)
Dept: GASTROENTEROLOGY | Facility: CLINIC | Age: 70
End: 2025-05-23
Payer: MEDICARE

## 2025-05-23 NOTE — TELEPHONE ENCOUNTER
Provider: FARAZ    Caller: Trevor Fox    Relationship to Patient: Self    Phone Number: [phone]     Reason for Call: PATIENT IS WANTING TO HAVE THE COLONOSCOPY PREP INSTRUCTIONS MAILED BACK OUT TO HIM. HE IS SCHEDULED 6/3/2025.

## 2025-05-27 ENCOUNTER — TELEPHONE (OUTPATIENT)
Dept: GASTROENTEROLOGY | Facility: CLINIC | Age: 70
End: 2025-05-27
Payer: MEDICARE

## 2025-05-27 NOTE — TELEPHONE ENCOUNTER
"Hub staff attempted to follow warm transfer process and was unsuccessful     Caller: Trevor Fox    Relationship to patient: Self    Best call back number: [phone] OKAY TO LEAVE A VOICEMAIL    Patient is needing: PATIENT'S COLONOSCOPY IS ON 6/3/25.HIS INSTRUCTIONS SAY NOT TO TAKE \"Furosemide (lasix, demadex, torsemide) \" UNTIL AFTER THE PROCEDURE BUT DOES NOT SAY WHEN TO STOP TAKING THEM BEFORE. PLEASE CALL TO ADVISE.           "

## 2025-05-28 NOTE — TELEPHONE ENCOUNTER
RN returned call to patient and discussed patient's questions and concerns. Pt reports his diuretic therapy has been discontinued. Pt verbalized understanding to instructions and no other concerns were discussed. EL

## 2025-05-30 NOTE — SIGNIFICANT NOTE
Education provided the Patient on the following:    - Nothing to Eat or Drink after MN the night before the procedure  - Avoid red/purple fluids while completing their bowel prep as ordered by physician  - Contact Gastrointerologist office for any questions about specific details regarding colon prep  - You will need to have someone drive you home after your colonoscopy and remain with you for 24 hours after the procedure  - The date of your Surgery, you may have one visitor at bedside or within 10-15 minutes of [facility]  - Please wear warm socks when you arrive for your colonoscopy  - Remove all jewelry and leave any valuables before arriving the day of your procedure (all will have to be removed before leaving preop)  - You will need to arrive at 1045 on 6-3-25 for your colonoscopy at [facility] located at [address], [address].  - Feel free to contact us at: [phone] with any additional questions/concerns

## 2025-05-30 NOTE — SIGNIFICANT NOTE
Education provided the Patient on the following:    - Nothing to Eat or Drink after MN the night before the procedure  - Avoid red/purple fluids while completing their bowel prep as ordered by physician  - Contact Gastrointerologist office for any questions about specific details regarding colon prep  - You will need to have someone drive you home after your colonoscopy and remain with you for 24 hours after the procedure  - The date of your Surgery, you may have one visitor at bedside or within 10-15 minutes of [facility]  - Please wear warm socks when you arrive for your colonoscopy  - Remove all jewelry and leave any valuables before arriving the day of your procedure (all will have to be removed before leaving preop)  - You will need to arrive at 1045 on 6-3-25 for your colonoscopy at [facility] located at [address], [address].  - Feel free to contact us at: [phone] with any additional questions/concerns

## 2025-06-02 ENCOUNTER — TELEPHONE (OUTPATIENT)
Dept: GASTROENTEROLOGY | Facility: CLINIC | Age: 70
End: 2025-06-02

## 2025-06-02 NOTE — TELEPHONE ENCOUNTER
Hub staff attempted to follow warm transfer process and was unsuccessful     Caller: Trevor Fox    Relationship to patient: Self    Best call back number: [phone]     Patient is needing: PATIENT IS SCHEDULED FOR A COLONOSCOPY TOMORROW, 6/3/25.  PATIENT STATES HE HAS [employer] INSURANCE BUT HAS NOT RECEIVED HIS NEW CARD.  HE IS ASKING IF HE NEEDS TO RESCHEDULE HIS PROCEDURE.  PLEASE CALL BACK TO ADVISE.

## 2025-06-03 ENCOUNTER — HOSPITAL ENCOUNTER (OUTPATIENT)
Facility: SURGERY CENTER | Age: 70
Setting detail: HOSPITAL OUTPATIENT SURGERY
Discharge: HOME | End: 2025-06-03
Attending: INTERNAL MEDICINE | Admitting: INTERNAL MEDICINE
Payer: MEDICARE

## 2025-06-03 ENCOUNTER — ANESTHESIA (OUTPATIENT)
Dept: SURGERY | Facility: SURGERY CENTER | Age: 70
End: 2025-06-03
Payer: MEDICARE

## 2025-06-03 ENCOUNTER — ANESTHESIA EVENT (OUTPATIENT)
Dept: SURGERY | Facility: SURGERY CENTER | Age: 70
End: 2025-06-03
Payer: MEDICARE

## 2025-06-03 VITALS
DIASTOLIC BLOOD PRESSURE: 81 MMHG | BODY MASS INDEX: 19.91 KG/M2 | HEART RATE: 78 BPM | TEMPERATURE: 97.8 F | HEIGHT: 72 IN | OXYGEN SATURATION: 100 % | SYSTOLIC BLOOD PRESSURE: 134 MMHG | RESPIRATION RATE: 16 BRPM | WEIGHT: 147 LBS

## 2025-06-03 DIAGNOSIS — Z12.11 ENCOUNTER FOR SCREENING FOR MALIGNANT NEOPLASM OF COLON: ICD-10-CM

## 2025-06-03 DIAGNOSIS — Z80.0 FAMILY HISTORY OF COLON CANCER: ICD-10-CM

## 2025-06-03 DIAGNOSIS — Z86.0101 HISTORY OF ADENOMATOUS AND SERRATED COLON POLYPS: ICD-10-CM

## 2025-06-03 PROCEDURE — 88305 TISSUE EXAM BY PATHOLOGIST: CPT | Performed by: INTERNAL MEDICINE

## 2025-06-03 PROCEDURE — 25810000003 LACTATED RINGERS PER 1000 ML: Performed by: INTERNAL MEDICINE

## 2025-06-03 PROCEDURE — 25010000002 PROPOFOL 10 MG/ML EMULSION: Performed by: NURSE ANESTHETIST, CERTIFIED REGISTERED

## 2025-06-03 PROCEDURE — 25010000002 PROPOFOL 1000 MG/100ML EMULSION: Performed by: NURSE ANESTHETIST, CERTIFIED REGISTERED

## 2025-06-03 PROCEDURE — 25010000002 LIDOCAINE 1 % SOLUTION: Performed by: NURSE ANESTHETIST, CERTIFIED REGISTERED

## 2025-06-03 PROCEDURE — 45380 COLONOSCOPY AND BIOPSY: CPT | Performed by: INTERNAL MEDICINE

## 2025-06-03 RX ORDER — SODIUM CHLORIDE 0.9 % (FLUSH) 0.9 %
3 SYRINGE (ML) INJECTION EVERY 12 HOURS SCHEDULED
Status: DISCONTINUED | OUTPATIENT
Start: 2025-06-03 | End: 2025-06-03 | Stop reason: HOSPADM

## 2025-06-03 RX ORDER — LIDOCAINE HYDROCHLORIDE 10 MG/ML
INJECTION, SOLUTION INFILTRATION; PERINEURAL AS NEEDED
Status: DISCONTINUED | OUTPATIENT
Start: 2025-06-03 | End: 2025-06-03 | Stop reason: SURG

## 2025-06-03 RX ORDER — PROPOFOL 10 MG/ML
INJECTION, EMULSION INTRAVENOUS AS NEEDED
Status: DISCONTINUED | OUTPATIENT
Start: 2025-06-03 | End: 2025-06-03 | Stop reason: SURG

## 2025-06-03 RX ORDER — SODIUM CHLORIDE 0.9 % (FLUSH) 0.9 %
10 SYRINGE (ML) INJECTION AS NEEDED
Status: DISCONTINUED | OUTPATIENT
Start: 2025-06-03 | End: 2025-06-03 | Stop reason: HOSPADM

## 2025-06-03 RX ORDER — SODIUM CHLORIDE, SODIUM LACTATE, POTASSIUM CHLORIDE, CALCIUM CHLORIDE 600; 310; 30; 20 MG/100ML; MG/100ML; MG/100ML; MG/100ML
30 INJECTION, SOLUTION INTRAVENOUS CONTINUOUS PRN
Status: ACTIVE | OUTPATIENT
Start: 2025-06-03 | End: 2025-06-03

## 2025-06-03 RX ADMIN — LIDOCAINE HYDROCHLORIDE 60 MG: 10 INJECTION, SOLUTION INFILTRATION; PERINEURAL at 11:28

## 2025-06-03 RX ADMIN — SODIUM CHLORIDE, POTASSIUM CHLORIDE, SODIUM LACTATE AND CALCIUM CHLORIDE 30 ML/HR: 600; 310; 30; 20 INJECTION, SOLUTION INTRAVENOUS at 11:18

## 2025-06-03 RX ADMIN — PROPOFOL 180 MCG/KG/MIN: 10 INJECTION, EMULSION INTRAVENOUS at 11:28

## 2025-06-03 RX ADMIN — PROPOFOL 60 MG: 10 INJECTION, EMULSION INTRAVENOUS at 11:28

## 2025-06-03 NOTE — ANESTHESIA POSTPROCEDURE EVALUATION
Patient: Trevor Fox    Procedure Summary       Date: 06/03/25 Room / Location: [facility]    Anesthesia Start: 1124 Anesthesia Stop: 1153    Procedure: COLONOSCOPY to cecum with bx polypectomy Diagnosis:       History of adenomatous and serrated colon polyps      Encounter for screening for malignant neoplasm of colon      Family history of colon cancer      (History of adenomatous and serrated colon polyps [Z86.0101])      (Encounter for screening for malignant neoplasm of colon [Z12.11])      (Family history of colon cancer [Z80.0])    Surgeons: Ramon Valdivia MD Provider: Alli Herron MD    Anesthesia Type: MAC ASA Status: 3            Anesthesia Type: MAC    Vitals  Vitals Value Taken Time   /82 06/03/25 12:44   Temp 36.6 °C (97.8 °F) 06/03/25 11:50   Pulse 78 06/03/25 12:34   Resp 16 06/03/25 12:05   SpO2 100 % 06/03/25 12:28   Vitals shown include unfiled device data.        Post Anesthesia Care and Evaluation    Patient location during evaluation: bedside  Patient participation: complete - patient participated  Level of consciousness: awake  Pain management: adequate    Airway patency: patent  Anesthetic complications: No anesthetic complications  PONV Status: none  Cardiovascular status: acceptable  Respiratory status: acceptable  Hydration status: acceptable  Post Neuraxial Block status: Motor and sensory function returned to baseline

## 2025-06-03 NOTE — H&P
No chief complaint on file.      HPI  H/o polyps  [facility]         Problem List:    Patient Active Problem List   Diagnosis    Family history of colon cancer    Polyp of colon    Encounter for screening for malignant neoplasm of colon    History of colon polyps    Screening for malignant neoplasm of colon    History of adenomatous and serrated colon polyps       Medical History:    Past Medical History:   Diagnosis Date    Anemia     Arthritis     Chronic schizophrenia     COPD (chronic obstructive pulmonary disease)     Enlarged prostate     GERD (gastroesophageal reflux disease)     Hyperlipidemia     Hypertension     Kidney disease     Stage 2    Seasonal allergies         Social History:    Social History     Socioeconomic History    Marital status:    Tobacco Use    Smoking status: Former    Smokeless tobacco: Former     Types: Chew    Tobacco comments:     30 years ago   Vaping Use    Vaping status: Never Used   Substance and Sexual Activity    Alcohol use: Not Currently    Drug use: Never    Sexual activity: Defer       Family History:   Family History   Problem Relation Age of Onset    Colon cancer Maternal Grandmother     Colon polyps Neg Hx     Crohn's disease Neg Hx     Irritable bowel syndrome Neg Hx     Ulcerative colitis Neg Hx        Surgical History:   Past Surgical History:   Procedure Laterality Date    COLONOSCOPY      COLONOSCOPY N/A 1/6/2022    Procedure: COLONOSCOPY FOR SCREENING;  Surgeon: Ramon Valdivia MD;  [place] [facility];  Service: Gastroenterology;  Laterality: N/A;  fair prep, diverticulosis, melanosis, polyps, tattoo at hepatic flexure    COLONOSCOPY N/A 5/17/2023    Procedure: COLONOSCOPY FOR SCREENING;  Surgeon: Ramon Valdivia MD;  [place] [facility];  Service: Gastroenterology;  Laterality: N/A;   proximal and distal ascending colon polyp transverse colon polyp  descending colon polyp sigmoid colon polyp     adequate prep    COLONOSCOPY W/ POLYPECTOMY N/A  5/20/2024    Procedure: COLONOSCOPY WITH POLYPECTOMY;  Surgeon: Ramon Valdivia MD;  [place] [facility];  Service: Gastroenterology;  Laterality: N/A;  fair prep, polyps x15, melanosis    CYST REMOVAL      ENDOSCOPY         No current facility-administered medications for this encounter.    Current Outpatient Medications:     Acetaminophen Extra Strength 500 MG tablet, TAKE ONE TABLET BY MOUTH THREE TIMES DAILY AS NEEDED NOT TO EXCEED 4GM ACETAMINOPHEN, Disp: , Rfl:     acetaminophen-codeine (TYLENOL #3) 300-30 MG per tablet, Take 0.5 tablets by mouth 2 (Two) Times a Day., Disp: , Rfl:     albuterol sulfate  (90 Base) MCG/ACT inhaler, INHALE ONE OR TWO PUFFS BY MOUTH FOUR TIMES DAILY AS NEEDED, Disp: , Rfl:     Antacid Regular Strength 500 MG chewable tablet, TAKE TWO TABLETS BY MOUTH THREE TIMES DAILY AS NEEDED, Disp: , Rfl:     aspirin 325 MG EC tablet, Take 1 tablet by mouth Daily., Disp: , Rfl:     atorvastatin (LIPITOR) 40 MG tablet, Take 1 tablet by mouth every night at bedtime., Disp: , Rfl:     benztropine (COGENTIN) 1 MG tablet, Take 2 tablets by mouth every night at bedtime., Disp: , Rfl:     busPIRone (BUSPAR) 10 MG tablet, Take 1 tablet by mouth 2 (Two) Times a Day., Disp: , Rfl:     cetirizine (zyrTEC) 10 MG tablet, Take 1 tablet by mouth Daily., Disp: , Rfl:     Chest Congestion Relief 400 MG tablet, , Disp: , Rfl:     chlorhexidine (PERIDEX) 0.12 % solution, RINSE TWO TO THREE TIMES PER DAY AS DIRECTED BY YOUR DENTIST, Disp: , Rfl:     Cholecalciferol 25 MCG (1000 UT) tablet, , Disp: , Rfl:     cloZAPine (CLOZARIL) 100 MG tablet, , Disp: , Rfl:     doxazosin (CARDURA) 4 MG tablet, Take 1 tablet by mouth every night at bedtime., Disp: , Rfl:     ferrous sulfate 325 (65 Fe) MG tablet, Take 1 tablet by mouth 2 (Two) Times a Day., Disp: , Rfl:     finasteride (PROSCAR) 5 MG tablet, Take 1 tablet by mouth Daily., Disp: , Rfl:     fluticasone (FLONASE) 50 MCG/ACT nasal spray, INSTILL TWO  SPRAYS in EACH nostril ONCE daily, Disp: , Rfl:     gabapentin (NEURONTIN) 300 MG capsule, , Disp: , Rfl:     GaviLAX 17 GM/SCOOP powder, Take 17 g by mouth 2 (Two) Times a Day., Disp: 510 g, Rfl: 5    GoodSense Mineral Oil liquid, , Disp: , Rfl:     hydrALAZINE (APRESOLINE) 50 MG tablet, Take 1 tablet by mouth 2 (Two) Times a Day., Disp: , Rfl:     Incruse Ellipta 62.5 MCG/INH aerosol powder , INHALE ONE PUFF BY MOUTH AT BEDTIME, Disp: , Rfl:     ketoconazole (NIZORAL) 2 % shampoo, , Disp: , Rfl:     lactulose (CHRONULAC) 10 GM/15ML solution, Take 15 mL by mouth Daily., Disp: 450 mL, Rfl: 5    meclizine (ANTIVERT) 25 MG tablet, Take 1 tablet by mouth Daily As Needed., Disp: , Rfl:     metoprolol tartrate (LOPRESSOR) 25 MG tablet, Take 1 tablet by mouth Daily., Disp: , Rfl:     Milk of Magnesia 400 MG/5ML suspension, , Disp: , Rfl:     mirtazapine (REMERON) 7.5 MG tablet, Take 1 tablet by mouth Daily., Disp: , Rfl:     montelukast (SINGULAIR) 10 MG tablet, Take 1 tablet by mouth Daily., Disp: , Rfl:     multivitamin tablet, Take 1 tablet by mouth Daily., Disp: , Rfl:     omeprazole (priLOSEC) 40 MG capsule, Take 1 capsule by mouth Daily., Disp: , Rfl:     Senexon-S 8.6-50 MG per tablet, Take 1 tablet by mouth 2 (Two) Times a Day., Disp: , Rfl:     Spiriva Respimat 2.5 MCG/ACT aerosol solution inhaler, Inhale 2 puffs Daily., Disp: , Rfl:     sucralfate (CARAFATE) 1 g tablet, Take 1 tablet by mouth 3 (Three) Times a Day., Disp: , Rfl:     Symbicort 80-4.5 MCG/ACT inhaler, Inhale 2 puffs 2 (Two) Times a Day., Disp: , Rfl:     Tab-A-Arpita/Iron/Beta Carotene tablet tablet, , Disp: , Rfl:     Allergies: No Known Allergies     The following portions of the patient's history were reviewed by me and updated as appropriate: review of systems, allergies, current medications, past family history, past medical history, past social history, past surgical history and problem list.    There were no vitals filed for this  visit.    PHYSICAL EXAM:    CONSTITUTIONAL:  today's vital signs reviewed by me  GASTROINTESTINAL: abdomen is soft nontender nondistended with normal active bowel sounds, no masses are appreciated    Assessment/ Plan  H/o polyps  [facility]    colonoscopy    Risks and benefits as well as alternatives to endoscopic evaluation were explained to the patient and they voiced understanding and wish to proceed.  These risks include but are not limited to the risk of bleeding, perforation, adverse reaction to sedation, and missed lesions.  The patient was given the opportunity to ask questions prior to the endoscopic procedure.

## 2025-06-04 LAB
CYTO UR: NORMAL
LAB AP CASE REPORT: NORMAL
PATH REPORT.FINAL DX SPEC: NORMAL
PATH REPORT.GROSS SPEC: NORMAL

## 2025-06-16 NOTE — PROGRESS NOTES
Chief Complaint   Patient presents with    GI Problem         History of Present Illness  Patient is a 69-year-old male who presents today for follow-up.  He has a history of chronic idiopathic constipation, colon polyps, and small bowel obstruction.  He has a family history of colon polyps and colon cancer.    Most recent colonoscopy June 2025 with mild melanosis in the colon, multiple polyps, internal hemorrhoids.    He had a CT scan completed at his urologist that showed common bile duct dilatation of uncertain etiology.  He has had elevation of alkaline phosphatase.    Patient presents today for follow-up.  He denies any family history of liver disease and no personal history of liver disease.    He reports bloating and shortness of breath at times.  He denies any nausea or vomiting.    Denies any abdominal pain.     Result Review :       COMPREHENSIVE METABOLIC PANEL (06/05/2025 20:35)    IMAGING SCANNED (03/07/2025)    Tissue Pathology Exam (06/03/2025 11:38)    Colonoscopy (06/03/2025 11:15)    Office Visit with Sylvia Birch APRN (01/02/2024)    Vital Signs:   /98   Pulse 92   Temp 98.1 °F (36.7 °C)   Wt 68.4 kg (150 lb 11.2 oz)   SpO2 99%   BMI 20.44 kg/m²     Body mass index is 20.44 kg/m².     Physical Exam  Vitals reviewed.   Constitutional:       General: He is not in acute distress.     Appearance: He is well-developed.   HENT:      Head: Normocephalic and atraumatic.   Pulmonary:      Effort: Pulmonary effort is normal. No respiratory distress.   Skin:     General: Skin is dry.      Coloration: Skin is not pale.   Neurological:      Mental Status: He is alert and oriented to person, place, and time.   Psychiatric:         Thought Content: Thought content normal.           Assessment and Plan    Diagnoses and all orders for this visit:    1. Common bile duct dilation (Primary)  -     MRI abdomen w wo contrast mrcp; Future    2. Elevated alkaline phosphatase level  -     MRI abdomen w  wo contrast mrcp; Future  -     Mitochondrial Antibodies, M2  -     Gamma GT  -     Alkaline Phosphatase, Isoenzymes    3. History of colon polyps         Discussion  Patient presents today for evaluation.  He has had CT scan showing common bile duct dilatation of uncertain etiology with lab work showing elevated alkaline phosphatase level.  Discussed recommendation for MRCP for further evaluation and to assess for any evidence of choledocholithiasis, bile duct stricturing, or lesion that could be contributing to the dilation.  We will also check lab work today to further evaluate elevated alkaline phosphatase level to include fractionating alkaline phosphatase and lab work to screen for primary biliary cholangitis.          Follow Up   Return for Follow up to review results after testing complete.    Patient Instructions   Schedule MRI with MRCP for further evaluation.    Check lab work today.    For surveillance of colon polyps, colonoscopy recommended at 2-year interval, due June 2027.

## 2025-06-17 ENCOUNTER — OFFICE VISIT (OUTPATIENT)
Dept: GASTROENTEROLOGY | Facility: CLINIC | Age: 70
End: 2025-06-17
Payer: MEDICARE

## 2025-06-17 ENCOUNTER — LAB (OUTPATIENT)
Dept: LAB | Facility: HOSPITAL | Age: 70
End: 2025-06-17
Payer: MEDICARE

## 2025-06-17 VITALS
BODY MASS INDEX: 20.44 KG/M2 | TEMPERATURE: 98.1 F | OXYGEN SATURATION: 99 % | SYSTOLIC BLOOD PRESSURE: 136 MMHG | DIASTOLIC BLOOD PRESSURE: 98 MMHG | WEIGHT: 150.7 LBS | HEART RATE: 92 BPM

## 2025-06-17 DIAGNOSIS — R74.8 ELEVATED ALKALINE PHOSPHATASE LEVEL: ICD-10-CM

## 2025-06-17 DIAGNOSIS — Z86.0100 HISTORY OF COLON POLYPS: ICD-10-CM

## 2025-06-17 DIAGNOSIS — K83.8 COMMON BILE DUCT DILATION: Primary | ICD-10-CM

## 2025-06-17 LAB — GGT SERPL-CCNC: 22 U/L (ref 8–61)

## 2025-06-17 PROCEDURE — 99214 OFFICE O/P EST MOD 30 MIN: CPT | Performed by: NURSE PRACTITIONER

## 2025-06-17 PROCEDURE — 84075 ASSAY ALKALINE PHOSPHATASE: CPT | Performed by: NURSE PRACTITIONER

## 2025-06-17 PROCEDURE — 1160F RVW MEDS BY RX/DR IN RCRD: CPT | Performed by: NURSE PRACTITIONER

## 2025-06-17 PROCEDURE — 82977 ASSAY OF GGT: CPT | Performed by: NURSE PRACTITIONER

## 2025-06-17 PROCEDURE — 1159F MED LIST DOCD IN RCRD: CPT | Performed by: NURSE PRACTITIONER

## 2025-06-17 PROCEDURE — 84080 ASSAY ALKALINE PHOSPHATASES: CPT | Performed by: NURSE PRACTITIONER

## 2025-06-17 PROCEDURE — 86381 MITOCHONDRIAL ANTIBODY EACH: CPT | Performed by: NURSE PRACTITIONER

## 2025-06-17 PROCEDURE — 36415 COLL VENOUS BLD VENIPUNCTURE: CPT | Performed by: NURSE PRACTITIONER

## 2025-06-17 NOTE — PATIENT INSTRUCTIONS
Schedule MRI with MRCP for further evaluation.    Check lab work today.    For surveillance of colon polyps, colonoscopy recommended at 2-year interval, due June 2027.

## 2025-06-18 LAB — MITOCHONDRIA M2 IGG SER-ACNC: <20 UNITS (ref 0–20)

## 2025-06-19 LAB
ALP BONE CFR SERPL: 40 % (ref 12–68)
ALP INTEST CFR SERPL: 7 % (ref 0–18)
ALP LIVER CFR SERPL: 53 % (ref 13–88)
ALP SERPL-CCNC: 224 IU/L (ref 44–121)

## 2025-08-13 ENCOUNTER — HOSPITAL ENCOUNTER (OUTPATIENT)
Dept: MRI IMAGING | Facility: HOSPITAL | Age: 70
Discharge: HOME | End: 2025-08-13
Admitting: NURSE PRACTITIONER
Payer: MEDICARE

## 2025-08-13 DIAGNOSIS — K83.8 COMMON BILE DUCT DILATION: ICD-10-CM

## 2025-08-13 DIAGNOSIS — R74.8 ELEVATED ALKALINE PHOSPHATASE LEVEL: ICD-10-CM

## 2025-08-13 PROCEDURE — 25510000002 GADOBENATE DIMEGLUMINE 529 MG/ML SOLUTION: Performed by: NURSE PRACTITIONER

## 2025-08-13 PROCEDURE — A9577 INJ MULTIHANCE: HCPCS | Performed by: NURSE PRACTITIONER

## 2025-08-13 PROCEDURE — 82565 ASSAY OF CREATININE: CPT

## 2025-08-13 PROCEDURE — 74183 MRI ABD W/O CNTR FLWD CNTR: CPT

## 2025-08-13 RX ADMIN — GADOBENATE DIMEGLUMINE 14 ML: 529 INJECTION, SOLUTION INTRAVENOUS at 12:22

## 2025-08-14 LAB — CREAT BLDA-MCNC: 1.4 MG/DL (ref 0.6–1.3)

## 2025-08-28 ENCOUNTER — TELEPHONE (OUTPATIENT)
Dept: GASTROENTEROLOGY | Facility: CLINIC | Age: 70
End: 2025-08-28
Payer: MEDICARE

## (undated) DEVICE — LASSO POLYPECTOMY SNARE: Brand: LASSO

## (undated) DEVICE — FLEX ADVANTAGE 1500CC: Brand: FLEX ADVANTAGE

## (undated) DEVICE — NDL SCLEROTHERAPY INTERJECT 25G 4 240CM

## (undated) DEVICE — GOWN PROC ENDOARMOR GI LVL3 HY/SHLD UNIV

## (undated) DEVICE — SINGLE-USE BIOPSY FORCEPS: Brand: RADIAL JAW 4

## (undated) DEVICE — KT ORCA ORCAPOD DISP STRL

## (undated) DEVICE — SINGLE-USE POLYPECTOMY SNARE: Brand: SENSATION SHORT THROW

## (undated) DEVICE — SUREFIT, DUAL DISPERSIVE ELECTRODE, CONTACT QUALITY MONITOR: Brand: SUREFIT

## (undated) DEVICE — Device

## (undated) DEVICE — JACKT LAB F/R KNIT CUFF/COLR XLG BLU

## (undated) DEVICE — CANN NASL CO2 TRULINK W/O2 A/

## (undated) DEVICE — ADAPT CLN SCPE ENDO PORPOISE BX/50 DISP

## (undated) DEVICE — THE SINGLE USE ETRAP – POLYP TRAP IS USED FOR SUCTION RETRIEVAL OF ENDOSCOPICALLY REMOVED POLYPS.: Brand: ETRAP

## (undated) DEVICE — Device: Brand: SPOT EX ENDOSCOPIC TATTOO

## (undated) DEVICE — VIAL FORMLN CAP 10PCT 20ML

## (undated) DEVICE — SYRINGE, LUER SLIP, STERILE, 60ML: Brand: MEDLINE

## (undated) DEVICE — TRAP POLYP 4CHAMBER

## (undated) DEVICE — CANN O2 ETCO2 FITS ALL CONN CO2 SMPL A/ 7IN DISP LF